# Patient Record
Sex: MALE | Race: WHITE | Employment: OTHER | URBAN - METROPOLITAN AREA
[De-identification: names, ages, dates, MRNs, and addresses within clinical notes are randomized per-mention and may not be internally consistent; named-entity substitution may affect disease eponyms.]

---

## 2017-12-05 ENCOUNTER — OFFICE VISIT (OUTPATIENT)
Dept: INTERNAL MEDICINE CLINIC | Age: 62
End: 2017-12-05

## 2017-12-05 VITALS
TEMPERATURE: 97.9 F | BODY MASS INDEX: 37.23 KG/M2 | DIASTOLIC BLOOD PRESSURE: 70 MMHG | SYSTOLIC BLOOD PRESSURE: 112 MMHG | HEART RATE: 80 BPM | HEIGHT: 67 IN | WEIGHT: 237.2 LBS | OXYGEN SATURATION: 95 % | RESPIRATION RATE: 16 BRPM

## 2017-12-05 DIAGNOSIS — Z12.5 PROSTATE CANCER SCREENING: ICD-10-CM

## 2017-12-05 DIAGNOSIS — Z00.00 PHYSICAL EXAM: Primary | ICD-10-CM

## 2017-12-05 DIAGNOSIS — R10.31 RIGHT GROIN PAIN: ICD-10-CM

## 2017-12-05 DIAGNOSIS — R45.84 ANHEDONIA: ICD-10-CM

## 2017-12-05 DIAGNOSIS — Z23 NEED FOR SHINGLES VACCINE: ICD-10-CM

## 2017-12-05 DIAGNOSIS — B35.1 ONYCHOMYCOSIS: ICD-10-CM

## 2017-12-05 DIAGNOSIS — Z11.59 NEED FOR HEPATITIS C SCREENING TEST: ICD-10-CM

## 2017-12-05 DIAGNOSIS — Z23 NEED FOR PNEUMOCOCCAL VACCINE: ICD-10-CM

## 2017-12-05 DIAGNOSIS — L57.0 ACTINIC KERATOSES: ICD-10-CM

## 2017-12-05 RX ORDER — TERBINAFINE HYDROCHLORIDE 250 MG/1
250 TABLET ORAL DAILY
Qty: 30 TAB | Refills: 0 | Status: SHIPPED | OUTPATIENT
Start: 2017-12-05 | End: 2018-01-05 | Stop reason: SDUPTHER

## 2017-12-05 NOTE — PROGRESS NOTES
HISTORY OF PRESENT ILLNESS  Martita Teixeira is a 57 y.o. male. HPI Shilpa Newell is seen today for a complete physical examination as well as for follow up of other concerns. He is accompanied by his wife. Preventive medicine. Fully reviewed today. He is due for a complete physical examination and routine screening laboratory studies. Also, due for an EKG update. He is up to date with a Tdap booster. Will hold off on Zostavax given the new shingles vaccine coming out next year. Other concerns. 1. There is question of depression. He has lost some interest in certain activities that previously he found enjoyable. He has not withdrawn, however, nor does he feel sad or have thoughts of harming himself. He really feels he would not want medication treatment. In the end we decided to follow the symptoms and will let me know should he worsen. We will check a testosterone level to check normal level. 2. Toe nail fungus and skin lesion on his nose. He also has an irritable scalp. For these various conditions, we will refer for dermatology evaluation. Consider Lamisil once his labs are complete. 3. Right groin pain. This tends to wax and wane and is sharp. We will obtain an x-ray. 4. Left hand pain for several months consistent with arthritis. We counseled for 15 minutes regarding his various medical questions and concerns, as above, reviewed a plan for each condition. This component of the visit was 15 minutes, greater than 50% of this component of the visit was spent in counseling. MedDATA/gwo     We counseled regarding healthy lifestyle issues including diet, exercise and stress management. Family history, social history, etc. Are reviewed and updated, see electronic record. I have reviewed/discussed the above normal BMI with the patient and spouse. I have recommended the following interventions: dietary management education, guidance, and counseling and encourage exercise . Faraz Hart        Review of Systems Constitutional: Negative for weight loss. Respiratory: Negative. Cardiovascular: Negative for chest pain, palpitations, leg swelling and PND. Gastrointestinal: Negative. Genitourinary: Positive for frequency. Negative for hematuria. Musculoskeletal: Positive for joint pain. Negative for myalgias. Skin: Positive for itching and rash. Neurological: Negative for focal weakness. Psychiatric/Behavioral: Negative for depression. The patient is not nervous/anxious. Physical Exam   Constitutional: He is oriented to person, place, and time. He appears well-developed and well-nourished. No distress. HENT:   Head: Normocephalic and atraumatic. Right Ear: Tympanic membrane, external ear and ear canal normal.   Left Ear: Tympanic membrane, external ear and ear canal normal.   Eyes: EOM are normal. Pupils are equal, round, and reactive to light. Right eye exhibits no discharge. Left eye exhibits no discharge. Neck: Normal range of motion. Neck supple. Carotid bruit is not present. No thyromegaly present. Cardiovascular: Normal rate, regular rhythm, normal heart sounds and intact distal pulses. Exam reveals no gallop and no friction rub. No murmur heard. Pulmonary/Chest: Effort normal and breath sounds normal. No respiratory distress. He has no wheezes. He has no rales. Abdominal: Soft. Bowel sounds are normal. He exhibits no distension and no mass. There is no tenderness. There is no rebound and no guarding. Genitourinary: Rectum normal. Rectal exam shows no mass and no tenderness. Prostate is enlarged. Prostate is not tender. Musculoskeletal: Normal range of motion. He exhibits no edema or tenderness. Lymphadenopathy:     He has no cervical adenopathy. Neurological: He is alert and oriented to person, place, and time. He has normal reflexes. Skin: Skin is warm and dry. No rash noted. Psychiatric: He has a normal mood and affect.  His behavior is normal.   Nursing note and vitals reviewed. ASSESSMENT and PLAN  Diagnoses and all orders for this visit:    1. Physical exam  -     AMB POC EKG ROUTINE W/ 12 LEADS, INTER & REP  -     TSH 3RD GENERATION  -     METABOLIC PANEL, COMPREHENSIVE  -     CBC WITH AUTOMATED DIFF  -     LIPID PANEL  -     URINALYSIS W/ RFLX MICROSCOPIC    2. Need for hepatitis C screening test  -     HEPATITIS C AB    3. Prostate cancer screening  -     PSA SCREENING (SCREENING)    4. Need for shingles vaccine- hold    5. Need for pneumococcal vaccine- when turns 63    6. Anhedonia  -     TESTOSTERONE, FREE & TOTAL    7. Actinic keratoses  -     REFERRAL TO DERMATOLOGY    8. Right groin pain  -     XR HIP RT W OR WO PELV 2-3 VWS; Future    9. Onychomycosis  -     terbinafine HCl (LAMISIL) 250 mg tablet; Take 1 Tab by mouth daily.

## 2017-12-05 NOTE — MR AVS SNAPSHOT
Visit Information Date & Time Provider Department Dept. Phone Encounter #  
 12/5/2017  3:45 PM Zackery Koch, [address] [facility] [phone] [id number] Follow-up Instructions Return in about 1 year (around 12/5/2018). Upcoming Health Maintenance Date Due Hepatitis C Screening 1955 ZOSTER VACCINE AGE 60> 7/17/2013 DTaP/Tdap/Td series (2 - Td) 5/20/2024 Allergies as of 12/5/2017  Review Complete On: 12/5/2017 By: Zackery Koch MD  
  
 Severity Noted Reaction Type Reactions Latex, Natural Rubber Low 05/20/2014   Topical Itching Current Immunizations  Reviewed on 5/20/2014 Name Date Tdap 5/20/2014 Not reviewed this visit You Were Diagnosed With   
  
 Codes Comments Physical exam    -  Primary ICD-10-CM: Z00.00 ICD-9-CM: V70.9 Need for hepatitis C screening test     ICD-10-CM: Z11.59 
ICD-9-CM: V73.89 Prostate cancer screening     ICD-10-CM: Z12.5 ICD-9-CM: V76.44 Need for shingles vaccine     ICD-10-CM: L72 ICD-9-CM: V04.89 Need for pneumococcal vaccine     ICD-10-CM: N09 ICD-9-CM: V03.82 Anhedonia     ICD-10-CM: R45.84 ICD-9-CM: 780.99 Actinic keratoses     ICD-10-CM: L57.0 ICD-9-CM: 702.0 Right groin pain     ICD-10-CM: R10.31 ICD-9-CM: 789.09 Onychomycosis     ICD-10-CM: B35.1 ICD-9-CM: 110.1 Vitals BP Pulse Temp Resp Height(growth percentile) Weight(growth percentile) 112/70 (BP 1 Location: Right arm, BP Patient Position: Sitting) 80 97.9 °F (36.6 °C) (Oral) 16 5' 7\" (1.702 m) 237 lb 3.2 oz (107.6 kg) SpO2 BMI Smoking Status 95% 37.15 kg/m2 Never Smoker BMI and BSA Data Body Mass Index Body Surface Area  
 37.15 kg/m 2 2.26 m 2 Preferred Pharmacy Pharmacy Name Phone [address] [place] [address], [address] [place] [address] [phone] Your Updated Medication List  
  
 This list is accurate as of: 12/5/17  4:57 PM.  Always use your most recent med list.  
  
  
  
  
 ASPIR-81 81 mg tablet Generic drug:  aspirin delayed-release Take 81 mg by mouth daily. multivitamin, stress formula tablet Commonly known as:  STRESS TAB Take 1 Tab by mouth daily. terbinafine HCl 250 mg tablet Commonly known as:  LAMISIL Take 1 Tab by mouth daily. Prescriptions Sent to Pharmacy Refills  
 terbinafine HCl (LAMISIL) 250 mg tablet 0 Sig: Take 1 Tab by mouth daily. Class: Normal  
 Pharmacy: [facility] [address] [place] [address] AT [facility] [address] Ph #: [phone] Route: Oral  
  
We Performed the Following AMB POC EKG ROUTINE W/ 12 LEADS, INTER & REP [49551 CPT(R)] CBC WITH AUTOMATED DIFF [50751 CPT(R)] HEPATITIS C AB [36003 CPT(R)] LIPID PANEL [39850 CPT(R)] METABOLIC PANEL, COMPREHENSIVE [20312 CPT(R)] PSA SCREENING (SCREENING) [ HCP] REFERRAL TO DERMATOLOGY [REF19 Custom] TESTOSTERONE, FREE & TOTAL [00252 CPT(R)] TSH 3RD GENERATION [47659 CPT(R)] URINALYSIS W/ RFLX MICROSCOPIC [16468 CPT(R)] Follow-up Instructions Return in about 1 year (around 12/5/2018). To-Do List   
 12/06/2017 Imaging:  XR HIP RT W OR WO PELV 2-3 VWS Referral Information Referral ID Referred By Referred To  
  
 [id number] Celine WITT MD   
   [address] [place] [address] [address] [place] [address] Avenue Phone: [phone] Fax: [phone] Visits Status Start Date End Date 1 New Request 12/5/17 12/5/18 If your referral has a status of pending review or denied, additional information will be sent to support the outcome of this decision. Introducing [facility]!    
 Tiera Jordan introduces [employer] patient portal. Now you can access parts of your medical record, email your doctor's office, and request medication refills online. 1. In your internet browser, go to [email or web address] 2. Click on the First Time User? Click Here link in the Sign In box. You will see the New Member Sign Up page. 3. Enter your [employer] Access Code exactly as it appears below. You will not need to use this code after youve completed the sign-up process. If you do not sign up before the expiration date, you must request a new code. · [employer] Access Code: [email or web address] Expires: 3/5/2018  3:33 PM 
 
4. Enter the last four digits of your Social Security Number (xxxx) and Date of Birth (mm/dd/yyyy) as indicated and click Submit. You will be taken to the next sign-up page. 5. Create a [employer] ID. This will be your [employer] login ID and cannot be changed, so think of one that is secure and easy to remember. 6. Create a [employer] password. You can change your password at any time. 7. Enter your Password Reset Question and Answer. This can be used at a later time if you forget your password. 8. Enter your e-mail address. You will receive e-mail notification when new information is available in [address] 9. Click Sign Up. You can now view and download portions of your medical record. 10. Click the Download Summary menu link to download a portable copy of your medical information. If you have questions, please visit the Frequently Asked Questions section of the [employer] website. Remember, [employer] is NOT to be used for urgent needs. For medical emergencies, dial 911. Now available from your iPhone and Android! Please provide this summary of care documentation to your next provider. Your primary care clinician is listed as ELIGIO WITT. If you have any questions after today's visit, please call [phone].

## 2017-12-10 LAB
ALBUMIN SERPL-MCNC: 4.4 G/DL (ref 3.6–4.8)
ALBUMIN/GLOB SERPL: 1.8 {RATIO} (ref 1.2–2.2)
ALP SERPL-CCNC: 50 IU/L (ref 39–117)
ALT SERPL-CCNC: 18 IU/L (ref 0–44)
APPEARANCE UR: CLEAR
AST SERPL-CCNC: 21 IU/L (ref 0–40)
BASOPHILS # BLD AUTO: 0 X10E3/UL (ref 0–0.2)
BASOPHILS NFR BLD AUTO: 0 %
BILIRUB SERPL-MCNC: 0.7 MG/DL (ref 0–1.2)
BILIRUB UR QL STRIP: NEGATIVE
BUN SERPL-MCNC: 12 MG/DL (ref 8–27)
BUN/CREAT SERPL: 13 (ref 10–24)
CALCIUM SERPL-MCNC: 9.3 MG/DL (ref 8.6–10.2)
CHLORIDE SERPL-SCNC: 103 MMOL/L (ref 96–106)
CHOLEST SERPL-MCNC: 210 MG/DL (ref 100–199)
CO2 SERPL-SCNC: 24 MMOL/L (ref 18–29)
COLOR UR: YELLOW
CREAT SERPL-MCNC: 0.96 MG/DL (ref 0.76–1.27)
EOSINOPHIL # BLD AUTO: 0.3 X10E3/UL (ref 0–0.4)
EOSINOPHIL NFR BLD AUTO: 5 %
ERYTHROCYTE [DISTWIDTH] IN BLOOD BY AUTOMATED COUNT: 13.1 % (ref 12.3–15.4)
GFR SERPLBLD CREATININE-BSD FMLA CKD-EPI: 83 ML/MIN/1.73
GFR SERPLBLD CREATININE-BSD FMLA CKD-EPI: 96 ML/MIN/1.73
GLOBULIN SER CALC-MCNC: 2.5 G/DL (ref 1.5–4.5)
GLUCOSE SERPL-MCNC: 112 MG/DL (ref 65–99)
GLUCOSE UR QL: NEGATIVE
HCT VFR BLD AUTO: 44.8 % (ref 37.5–51)
HCV AB S/CO SERPL IA: <0.1 S/CO RATIO (ref 0–0.9)
HDLC SERPL-MCNC: 49 MG/DL
HGB BLD-MCNC: 15.5 G/DL (ref 13–17.7)
HGB UR QL STRIP: NEGATIVE
IMM GRANULOCYTES # BLD: 0 X10E3/UL (ref 0–0.1)
IMM GRANULOCYTES NFR BLD: 0 %
KETONES UR QL STRIP: NEGATIVE
LDLC SERPL CALC-MCNC: 139 MG/DL (ref 0–99)
LEUKOCYTE ESTERASE UR QL STRIP: NEGATIVE
LYMPHOCYTES # BLD AUTO: 2 X10E3/UL (ref 0.7–3.1)
LYMPHOCYTES NFR BLD AUTO: 32 %
MCH RBC QN AUTO: 30.5 PG (ref 26.6–33)
MCHC RBC AUTO-ENTMCNC: 34.6 G/DL (ref 31.5–35.7)
MCV RBC AUTO: 88 FL (ref 79–97)
MICRO URNS: NORMAL
MONOCYTES # BLD AUTO: 0.6 X10E3/UL (ref 0.1–0.9)
MONOCYTES NFR BLD AUTO: 10 %
NEUTROPHILS # BLD AUTO: 3.4 X10E3/UL (ref 1.4–7)
NEUTROPHILS NFR BLD AUTO: 53 %
NITRITE UR QL STRIP: NEGATIVE
PH UR STRIP: 6 [PH] (ref 5–7.5)
PLATELET # BLD AUTO: 208 X10E3/UL (ref 150–379)
POTASSIUM SERPL-SCNC: 4.4 MMOL/L (ref 3.5–5.2)
PROT SERPL-MCNC: 6.9 G/DL (ref 6–8.5)
PROT UR QL STRIP: NEGATIVE
PSA SERPL-MCNC: 0.7 NG/ML (ref 0–4)
RBC # BLD AUTO: 5.09 X10E6/UL (ref 4.14–5.8)
SODIUM SERPL-SCNC: 141 MMOL/L (ref 134–144)
SP GR UR: 1.02 (ref 1–1.03)
TESTOST FREE SERPL-MCNC: 9.7 PG/ML (ref 6.6–18.1)
TESTOST SERPL-MCNC: 569 NG/DL (ref 264–916)
TRIGL SERPL-MCNC: 109 MG/DL (ref 0–149)
TSH SERPL DL<=0.005 MIU/L-ACNC: 1.6 UIU/ML (ref 0.45–4.5)
UROBILINOGEN UR STRIP-MCNC: 0.2 MG/DL (ref 0.2–1)
VLDLC SERPL CALC-MCNC: 22 MG/DL (ref 5–40)
WBC # BLD AUTO: 6.4 X10E3/UL (ref 3.4–10.8)

## 2017-12-11 ENCOUNTER — HOSPITAL ENCOUNTER (OUTPATIENT)
Dept: GENERAL RADIOLOGY | Age: 62
Discharge: HOME | End: 2017-12-11
Payer: COMMERCIAL

## 2017-12-11 DIAGNOSIS — R10.31 RIGHT GROIN PAIN: ICD-10-CM

## 2017-12-11 PROCEDURE — 73502 X-RAY EXAM HIP UNI 2-3 VIEWS: CPT

## 2017-12-13 LAB
HBA1C MFR BLD: 5.5 % (ref 4.8–5.6)
SPECIMEN STATUS REPORT, ROLRST: NORMAL

## 2017-12-21 ENCOUNTER — TELEPHONE (OUTPATIENT)
Dept: INTERNAL MEDICINE CLINIC | Age: 62
End: 2017-12-21

## 2017-12-21 DIAGNOSIS — E78.2 MIXED HYPERLIPIDEMIA: Primary | ICD-10-CM

## 2017-12-21 RX ORDER — ATORVASTATIN CALCIUM 20 MG/1
20 TABLET, FILM COATED ORAL DAILY
Qty: 30 TAB | Refills: 11 | Status: SHIPPED | OUTPATIENT
Start: 2017-12-21 | End: 2018-12-06 | Stop reason: SDUPTHER

## 2017-12-21 NOTE — TELEPHONE ENCOUNTER
Reviewed lab - Labs look great overall except cholesterol. 10 yr pooled risk percent for atherosclerosis > 7.5. Advised lipitor, Reviewed side effects, goal of treatment and need for follow up  . He agrees. Check lipid panel and appropriate studies to rule out med side effects in 3 months. High risk med management.  With ov

## 2018-01-05 DIAGNOSIS — B35.1 ONYCHOMYCOSIS: ICD-10-CM

## 2018-01-05 RX ORDER — TERBINAFINE HYDROCHLORIDE 250 MG/1
250 TABLET ORAL DAILY
Qty: 30 TAB | Refills: 3 | Status: SHIPPED | OUTPATIENT
Start: 2018-01-05 | End: 2019-02-28 | Stop reason: ALTCHOICE

## 2018-03-25 LAB
ALBUMIN SERPL-MCNC: 4.4 G/DL (ref 3.6–4.8)
ALP SERPL-CCNC: 54 IU/L (ref 39–117)
ALT SERPL-CCNC: 14 IU/L (ref 0–44)
AST SERPL-CCNC: 18 IU/L (ref 0–40)
BILIRUB DIRECT SERPL-MCNC: 0.18 MG/DL (ref 0–0.4)
BILIRUB SERPL-MCNC: 0.7 MG/DL (ref 0–1.2)
CHOLEST SERPL-MCNC: 150 MG/DL (ref 100–199)
HDLC SERPL-MCNC: 51 MG/DL
LDLC SERPL CALC-MCNC: 82 MG/DL (ref 0–99)
PROT SERPL-MCNC: 6.8 G/DL (ref 6–8.5)
TRIGL SERPL-MCNC: 85 MG/DL (ref 0–149)
VLDLC SERPL CALC-MCNC: 17 MG/DL (ref 5–40)

## 2018-03-29 ENCOUNTER — OFFICE VISIT (OUTPATIENT)
Dept: INTERNAL MEDICINE CLINIC | Age: 63
End: 2018-03-29

## 2018-03-29 VITALS
BODY MASS INDEX: 36.85 KG/M2 | SYSTOLIC BLOOD PRESSURE: 115 MMHG | RESPIRATION RATE: 18 BRPM | HEART RATE: 86 BPM | WEIGHT: 234.8 LBS | OXYGEN SATURATION: 95 % | DIASTOLIC BLOOD PRESSURE: 60 MMHG | HEIGHT: 67 IN | TEMPERATURE: 99 F

## 2018-03-29 DIAGNOSIS — M54.41 ACUTE RIGHT-SIDED LOW BACK PAIN WITH RIGHT-SIDED SCIATICA: ICD-10-CM

## 2018-03-29 DIAGNOSIS — Z23 NEED FOR SHINGLES VACCINE: ICD-10-CM

## 2018-03-29 DIAGNOSIS — E78.2 MIXED HYPERLIPIDEMIA: Primary | ICD-10-CM

## 2018-03-29 DIAGNOSIS — Z00.00 ROUTINE GENERAL MEDICAL EXAMINATION AT A HEALTH CARE FACILITY: ICD-10-CM

## 2018-03-29 DIAGNOSIS — E66.01 SEVERE OBESITY (BMI 35.0-39.9) WITH COMORBIDITY (HCC): ICD-10-CM

## 2018-03-29 NOTE — PROGRESS NOTES
HISTORY OF PRESENT ILLNESS  Tim Amos is a 57 y.o. male. [facility] Tigre Mukherjee is seen today for follow up of hyperlipidemia and other concerns. 1.  Hyperlipidemia. Reviewed labs. He is new to statin treatment. He has significant elevated pooled risk percentage so we started treatment. He has had no side effect or cardiac symptom. He has had a great improvement in his cholesterol readings. 2.  Obesity, BMI 36+, encouraged diet and exercise. 3.  Nail fungus, on treatment with a good response. Review of  Systems:  Notable for right sided sciatica for 4 weeks, finally improving. Current Outpatient Prescriptions   Medication Sig    atorvastatin (LIPITOR) 20 mg tablet Take 1 Tab by mouth daily.  aspirin delayed-release (ASPIR-81) 81 mg tablet Take 81 mg by mouth daily.  terbinafine HCl (LAMISIL) 250 mg tablet Take 1 Tab by mouth daily. No current facility-administered medications for this visit. Review of Systems   Constitutional: Negative for weight loss. Respiratory: Negative. Cardiovascular: Negative for chest pain, palpitations, leg swelling and PND. Musculoskeletal: Positive for back pain. Negative for myalgias. Neurological: Negative for focal weakness. Physical Exam   Constitutional: No distress. Neck: Carotid bruit is not present. Cardiovascular: Normal rate and regular rhythm. Exam reveals no gallop and no friction rub. No murmur heard. Pulmonary/Chest: Effort normal and breath sounds normal. No respiratory distress. Musculoskeletal: He exhibits no edema. Nursing note and vitals reviewed. ASSESSMENT and PLAN  Diagnoses and all orders for this visit:    1. Mixed hyperlipidemia- Check lipid panel and appropriate studies to rule out med side effects yearly. 2. Need for shingles vaccine  -     varicella-zoster recombinant, PF, (SHINGRIX) 50 mcg/0.5 mL susr injection; 0.5 mL by IntraMUSCular route once for 1 dose. Repeat in 2 to 6mnths.     3. Severe obesity (BMI 35.0-39. 9) with comorbidity ([address]- Diet and exercise     4. Acute right-sided low back pain with right-sided sciatica- Call with recurrence , See patient instructions     5.  Routine general medical examination at a health care facility  -     TSH 3RD GENERATION  -     METABOLIC PANEL, COMPREHENSIVE  -     CBC WITH AUTOMATED DIFF  -     HEMOGLOBIN A1C WITH EAG  -     PSA SCREENING (SCREENING)  -     LIPID PANEL

## 2018-03-29 NOTE — PATIENT INSTRUCTIONS
Sciatica: Care Instructions  Your Care Instructions    Sciatica (say \"cyo-AB-ro-kuh\") is an irritation of one of the sciatic nerves, which come from the spinal cord in the lower back. The sciatic nerves and their branches extend down through the buttock to the foot. Sciatica can develop when an injured disc in the back presses against a spinal nerve root. Its main symptom is pain, numbness, or weakness that is often worse in the leg or foot than in the back. Sciatica often will improve and go away with time. Early treatment usually includes medicines and exercises to relieve pain. Follow-up care is a key part of your treatment and safety. Be sure to make and go to all appointments, and call your doctor if you are having problems. It's also a good idea to know your test results and keep a list of the medicines you take. How can you care for yourself at home? · Take pain medicines exactly as directed. ¨ If the doctor gave you a prescription medicine for pain, take it as prescribed. ¨ If you are not taking a prescription pain medicine, ask your doctor if you can take an over-the-counter medicine. · Use heat or ice to relieve pain. ¨ To apply heat, put a warm water bottle, heating pad set on low, or warm cloth on your back. Do not go to sleep with a heating pad on your skin. ¨ To use ice, put ice or a cold pack on the area for 10 to 20 minutes at a time. Put a thin cloth between the ice and your skin. · Avoid sitting if possible, unless it feels better than standing. · Alternate lying down with short walks. Increase your walking distance as you are able to without making your symptoms worse. · Do not do anything that makes your symptoms worse. When should you call for help? Call 911 anytime you think you may need emergency care. For example, call if:  · You are unable to move a leg at all.   Call your doctor now or seek immediate medical care if:  · You have new or worse symptoms in your legs or buttocks. Symptoms may include:  ¨ Numbness or tingling. ¨ Weakness. ¨ Pain. · You lose bladder or bowel control. Watch closely for changes in your health, and be sure to contact your doctor if:  · You are not getting better as expected. Where can you learn more? Go to http://chuck-maria.info/. Enter [phone] in the search box to learn more about \"Sciatica: Care Instructions. \"  Current as of: March 21, 2017  Content Version: 11.4  © 7546-4548 [employer]. Care instructions adapted under license by [employer] (which disclaims liability or warranty for this information). If you have questions about a medical condition or this instruction, always ask your healthcare professional. [address] any warranty or liability for your use of this information. Sciatica: Exercises  Your Care Instructions  Here are some examples of typical rehabilitation exercises for your condition. Start each exercise slowly. Ease off the exercise if you start to have pain. Your doctor or physical therapist will tell you when you can start these exercises and which ones will work best for you. When you are not being active, find a comfortable position for rest. Some people are comfortable on the floor or a medium-firm bed with a small pillow under their head and another under their knees. Some people prefer to lie on their side with a pillow between their knees. Don't stay in one position for too long. Take short walks (10 to 20 minutes) every 2 to 3 hours. Avoid slopes, hills, and stairs until you feel better. Walk only distances you can manage without pain, especially leg pain. How to do the exercises  Back stretches    1. Get down on your hands and knees on the floor. 2. Relax your head and allow it to droop. Round your back up toward the ceiling until you feel a nice stretch in your upper, middle, and lower back.  Hold this stretch for as long as it feels comfortable, or about 15 to 30 seconds. 3. Return to the starting position with a flat back while you are on your hands and knees. 4. Let your back sway by pressing your stomach toward the floor. Lift your buttocks toward the ceiling. 5. Hold this position for 15 to 30 seconds. 6. Repeat 2 to 4 times. Follow-up care is a key part of your treatment and safety. Be sure to make and go to all appointments, and call your doctor if you are having problems. It's also a good idea to know your test results and keep a list of the medicines you take. Where can you learn more? Go to http://chuck-maria.info/. Enter F004 in the search box to learn more about \"Sciatica: Exercises. \"  Current as of: March 21, 2017  Content Version: 11.4  © 8451-0784 [employer]. Care instructions adapted under license by [employer] (which disclaims liability or warranty for this information). If you have questions about a medical condition or this instruction, always ask your healthcare professional. [place] [address] any warranty or liability for your use of this information.

## 2018-03-29 NOTE — MR AVS SNAPSHOT
[address] [facility] [address] [place] [address] 57 
[phone] Patient: Virginia Garduno MRN: [id number] DRR:[id number] Visit Information Date & Time Provider Department Dept. Phone Encounter #  
 3/29/2018  5:05 PM Dedrick Trent, [facility] Internal Medicine [phone] [id number] Follow-up Instructions Return in about 9 months (around 12/29/2018). Upcoming Health Maintenance Date Due ZOSTER VACCINE AGE 60> 7/17/2013 DTaP/Tdap/Td series (2 - Td) 5/20/2024 Allergies as of 3/29/2018  Review Complete On: 3/29/2018 By: Dedrick Trent MD  
  
 Severity Noted Reaction Type Reactions Latex, Natural Rubber Low 05/20/2014   Topical Itching Current Immunizations  Reviewed on 5/20/2014 Name Date Tdap 5/20/2014 Not reviewed this visit You Were Diagnosed With   
  
 Codes Comments Mixed hyperlipidemia    -  Primary ICD-10-CM: Z99.1 ICD-9-CM: 272.2 Need for shingles vaccine     ICD-10-CM: H44 ICD-9-CM: V04.89 Severe obesity (BMI 35.0-39. 9) with comorbidity ([facility] [address]     ICD-10-CM: E66.01 
ICD-9-CM: 278.01 Acute right-sided low back pain with right-sided sciatica     ICD-10-CM: M54.41 
ICD-9-CM: 724.2, 724.3 Routine general medical examination at a health care facility     ICD-10-CM: Z00.00 ICD-9-CM: V70.0 Vitals BP Pulse Temp Resp Height(growth percentile) Weight(growth percentile)  
 115/60 (BP 1 Location: Right arm, BP Patient Position: Sitting) 86 99 °F (37.2 °C) (Oral) 18 5' 7\" (1.702 m) 234 lb 12.8 oz (106.5 kg) SpO2 BMI Smoking Status 95% 36.77 kg/m2 Never Smoker BMI and BSA Data Body Mass Index Body Surface Area  
 36.77 kg/m 2 2.24 m 2 Preferred Pharmacy Pharmacy Name Phone 99 [facility], [address] [place] [address] [phone] Your Updated Medication List  
  
   
 This list is accurate as of 3/29/18  5:55 PM.  Always use your most recent med list.  
  
  
  
  
 ASPIR-81 81 mg tablet Generic drug:  aspirin delayed-release Take 81 mg by mouth daily. atorvastatin 20 mg tablet Commonly known as:  LIPITOR Take 1 Tab by mouth daily. terbinafine HCl 250 mg tablet Commonly known as:  LAMISIL Take 1 Tab by mouth daily. varicella-zoster recombinant (PF) 50 mcg/0.5 mL Susr injection Commonly known as:  SHINGRIX  
0.5 mL by IntraMUSCular route once for 1 dose. Repeat in 2 to 6mnths. Prescriptions Printed Refills  
 varicella-zoster recombinant, PF, (SHINGRIX) 50 mcg/0.5 mL susr injection 0 Si.5 mL by IntraMUSCular route once for 1 dose. Repeat in 2 to 6mnths. Class: Print Route: IntraMUSCular We Performed the Following CBC WITH AUTOMATED DIFF [75634 CPT(R)] HEMOGLOBIN A1C WITH EAG [03213 CPT(R)] LIPID PANEL [46996 CPT(R)] METABOLIC PANEL, COMPREHENSIVE [69801 CPT(R)] PSA SCREENING (SCREENING) [ [facility]] TSH 3RD GENERATION [70596 CPT(R)] Follow-up Instructions Return in about 9 months (around 2018). Patient Instructions Sciatica: Care Instructions Your Care Instructions Sciatica (say \"voy-LM-kx-kuh\") is an irritation of one of the sciatic nerves, which come from the spinal cord in the lower back. The sciatic nerves and their branches extend down through the buttock to the foot. Sciatica can develop when an injured disc in the back presses against a spinal nerve root. Its main symptom is pain, numbness, or weakness that is often worse in the leg or foot than in the back. Sciatica often will improve and go away with time. Early treatment usually includes medicines and exercises to relieve pain. Follow-up care is a key part of your treatment and safety.  Be sure to make and go to all appointments, and call your doctor if you are having problems. It's also a good idea to know your test results and keep a list of the medicines you take. How can you care for yourself at home? · Take pain medicines exactly as directed. ¨ If the doctor gave you a prescription medicine for pain, take it as prescribed. ¨ If you are not taking a prescription pain medicine, ask your doctor if you can take an over-the-counter medicine. · Use heat or ice to relieve pain. ¨ To apply heat, put a warm water bottle, heating pad set on low, or warm cloth on your back. Do not go to sleep with a heating pad on your skin. ¨ To use ice, put ice or a cold pack on the area for 10 to 20 minutes at a time. Put a thin cloth between the ice and your skin. · Avoid sitting if possible, unless it feels better than standing. · Alternate lying down with short walks. Increase your walking distance as you are able to without making your symptoms worse. · Do not do anything that makes your symptoms worse. When should you call for help? Call 911 anytime you think you may need emergency care. For example, call if: 
· You are unable to move a leg at all. Call your doctor now or seek immediate medical care if: 
· You have new or worse symptoms in your legs or buttocks. Symptoms may include: ¨ Numbness or tingling. ¨ Weakness. ¨ Pain. · You lose bladder or bowel control. Watch closely for changes in your health, and be sure to contact your doctor if: 
· You are not getting better as expected. Where can you learn more? Go to http://chuck-maria.info/. Enter [phone] in the search box to learn more about \"Sciatica: Care Instructions. \" Current as of: March 21, 2017 Content Version: 11.4 © 5602-4746 [employer]. Care instructions adapted under license by [employer] (which disclaims liability or warranty for this information).  If you have questions about a medical condition or this instruction, always ask your healthcare professional. [address] any warranty or liability for your use of this information. Sciatica: Exercises Your Care Instructions Here are some examples of typical rehabilitation exercises for your condition. Start each exercise slowly. Ease off the exercise if you start to have pain. Your doctor or physical therapist will tell you when you can start these exercises and which ones will work best for you. When you are not being active, find a comfortable position for rest. Some people are comfortable on the floor or a medium-firm bed with a small pillow under their head and another under their knees. Some people prefer to lie on their side with a pillow between their knees. Don't stay in one position for too long. Take short walks (10 to 20 minutes) every 2 to 3 hours. Avoid slopes, hills, and stairs until you feel better. Walk only distances you can manage without pain, especially leg pain. How to do the exercises Back stretches 1. Get down on your hands and knees on the floor. 2. Relax your head and allow it to droop. Round your back up toward the ceiling until you feel a nice stretch in your upper, middle, and lower back. Hold this stretch for as long as it feels comfortable, or about 15 to 30 seconds. 3. Return to the starting position with a flat back while you are on your hands and knees. 4. Let your back sway by pressing your stomach toward the floor. Lift your buttocks toward the ceiling. 5. Hold this position for 15 to 30 seconds. 6. Repeat 2 to 4 times. Follow-up care is a key part of your treatment and safety. Be sure to make and go to all appointments, and call your doctor if you are having problems. It's also a good idea to know your test results and keep a list of the medicines you take. Where can you learn more? Go to http://chuck-maria.info/. Enter R198 in the search box to learn more about \"Sciatica: Exercises. \" Current as of: March 21, 2017 Content Version: 11.4 © 3065-2140 [employer]. Care instructions adapted under license by [employer] (which disclaims liability or warranty for this information). If you have questions about a medical condition or this instruction, always ask your healthcare professional. [address] any warranty or liability for your use of this information. Introducing [facility]! Dear Danuta Fox: 
Thank you for requesting a Clerts! account. Our records indicate that you already have an active Clerts! account. You can access your account anytime at [email or web address] Did you know that you can access your hospital and ER discharge instructions at any time in Clerts!? You can also review all of your test results from your hospital stay or ER visit. Additional Information If you have questions, please visit the Frequently Asked Questions section of the Clerts! website at [email or web address] Remember, Clerts! is NOT to be used for urgent needs. For medical emergencies, dial 911. Now available from your iPhone and Android! Please provide this summary of care documentation to your next provider. Your primary care clinician is listed as ELIGIO WITT. If you have any questions after today's visit, please call [phone].

## 2018-12-06 RX ORDER — ATORVASTATIN CALCIUM 20 MG/1
20 TABLET, FILM COATED ORAL DAILY
Qty: 30 TAB | Refills: 0 | Status: SHIPPED | OUTPATIENT
Start: 2018-12-06 | End: 2019-01-12 | Stop reason: SDUPTHER

## 2019-02-15 LAB
ALBUMIN SERPL-MCNC: 4.5 G/DL (ref 3.6–4.8)
ALBUMIN/GLOB SERPL: 1.7 {RATIO} (ref 1.2–2.2)
ALP SERPL-CCNC: 61 IU/L (ref 39–117)
ALT SERPL-CCNC: 20 IU/L (ref 0–44)
AST SERPL-CCNC: 23 IU/L (ref 0–40)
BASOPHILS # BLD AUTO: 0 X10E3/UL (ref 0–0.2)
BASOPHILS NFR BLD AUTO: 0 %
BILIRUB SERPL-MCNC: 0.8 MG/DL (ref 0–1.2)
BUN SERPL-MCNC: 12 MG/DL (ref 8–27)
BUN/CREAT SERPL: 12 (ref 10–24)
CALCIUM SERPL-MCNC: 9.7 MG/DL (ref 8.6–10.2)
CHLORIDE SERPL-SCNC: 104 MMOL/L (ref 96–106)
CHOLEST SERPL-MCNC: 160 MG/DL (ref 100–199)
CO2 SERPL-SCNC: 24 MMOL/L (ref 20–29)
CREAT SERPL-MCNC: 1.02 MG/DL (ref 0.76–1.27)
EOSINOPHIL # BLD AUTO: 0.2 X10E3/UL (ref 0–0.4)
EOSINOPHIL NFR BLD AUTO: 2 %
ERYTHROCYTE [DISTWIDTH] IN BLOOD BY AUTOMATED COUNT: 13.5 % (ref 12.3–15.4)
EST. AVERAGE GLUCOSE BLD GHB EST-MCNC: 117 MG/DL
GLOBULIN SER CALC-MCNC: 2.7 G/DL (ref 1.5–4.5)
GLUCOSE SERPL-MCNC: 92 MG/DL (ref 65–99)
HBA1C MFR BLD: 5.7 % (ref 4.8–5.6)
HCT VFR BLD AUTO: 46 % (ref 37.5–51)
HDLC SERPL-MCNC: 55 MG/DL
HGB BLD-MCNC: 15.3 G/DL (ref 13–17.7)
IMM GRANULOCYTES # BLD AUTO: 0 X10E3/UL (ref 0–0.1)
IMM GRANULOCYTES NFR BLD AUTO: 0 %
LDLC SERPL CALC-MCNC: 88 MG/DL (ref 0–99)
LYMPHOCYTES # BLD AUTO: 3.6 X10E3/UL (ref 0.7–3.1)
LYMPHOCYTES NFR BLD AUTO: 33 %
MCH RBC QN AUTO: 30.2 PG (ref 26.6–33)
MCHC RBC AUTO-ENTMCNC: 33.3 G/DL (ref 31.5–35.7)
MCV RBC AUTO: 91 FL (ref 79–97)
MONOCYTES # BLD AUTO: 0.9 X10E3/UL (ref 0.1–0.9)
MONOCYTES NFR BLD AUTO: 8 %
NEUTROPHILS # BLD AUTO: 6.1 X10E3/UL (ref 1.4–7)
NEUTROPHILS NFR BLD AUTO: 57 %
PLATELET # BLD AUTO: 211 X10E3/UL (ref 150–379)
POTASSIUM SERPL-SCNC: 4.7 MMOL/L (ref 3.5–5.2)
PROT SERPL-MCNC: 7.2 G/DL (ref 6–8.5)
PSA SERPL-MCNC: 0.8 NG/ML (ref 0–4)
RBC # BLD AUTO: 5.07 X10E6/UL (ref 4.14–5.8)
SODIUM SERPL-SCNC: 143 MMOL/L (ref 134–144)
TRIGL SERPL-MCNC: 86 MG/DL (ref 0–149)
TSH SERPL DL<=0.005 MIU/L-ACNC: 2.84 UIU/ML (ref 0.45–4.5)
VLDLC SERPL CALC-MCNC: 17 MG/DL (ref 5–40)
WBC # BLD AUTO: 10.9 X10E3/UL (ref 3.4–10.8)

## 2019-02-28 ENCOUNTER — OFFICE VISIT (OUTPATIENT)
Dept: INTERNAL MEDICINE CLINIC | Age: 64
End: 2019-02-28

## 2019-02-28 VITALS
RESPIRATION RATE: 18 BRPM | TEMPERATURE: 97.6 F | HEIGHT: 67 IN | HEART RATE: 91 BPM | DIASTOLIC BLOOD PRESSURE: 78 MMHG | WEIGHT: 238 LBS | SYSTOLIC BLOOD PRESSURE: 110 MMHG | OXYGEN SATURATION: 98 % | BODY MASS INDEX: 37.35 KG/M2

## 2019-02-28 DIAGNOSIS — E78.2 MIXED HYPERLIPIDEMIA: ICD-10-CM

## 2019-02-28 DIAGNOSIS — Z00.00 ROUTINE GENERAL MEDICAL EXAMINATION AT A HEALTH CARE FACILITY: Primary | ICD-10-CM

## 2019-02-28 DIAGNOSIS — Z23 ENCOUNTER FOR IMMUNIZATION: ICD-10-CM

## 2019-02-28 DIAGNOSIS — Z12.11 SCREEN FOR COLON CANCER: ICD-10-CM

## 2019-02-28 NOTE — PROGRESS NOTES
HISTORY OF PRESENT ILLNESS Samuel Harvey is a 70 y.o. male. HPI Subjective:  Karen Bray is seen today for a complete exam and follow up of chronic problems. 1. Preventive medicine, fully reviewed with him today. He is due for the Pneumovax and the other vaccines were reviewed upon a call to his pharmacy. He is up to date with labs, which I fully reviewed. He has not yet had a colonoscopy. I talked to him about the Cologuard. He agrees to this testing. 2. Chronic problems are reviewed. Elevated cholesterol is well treated with medication and no side effects. Family History:  Notable for a brother with prostate cancer. Review of Systems:  Notable for some aches and pains. His daily multiple vitamin really helps with this. He does not require any other treatments. We counseled regarding healthy lifestyle issues including diet, exercise and stress management. Family history, social history, etc. Are reviewed and updated, see electronic record. Review of Systems Constitutional: Negative for weight loss. Respiratory: Negative. Cardiovascular: Negative for chest pain, palpitations, leg swelling and PND. Gastrointestinal: Negative. Genitourinary: Positive for frequency. Musculoskeletal: Positive for joint pain. Negative for myalgias. Neurological: Negative for focal weakness. Physical Exam  
Constitutional: He is oriented to person, place, and time. He appears well-developed and well-nourished. No distress. HENT:  
Head: Normocephalic and atraumatic. Right Ear: Tympanic membrane, external ear and ear canal normal.  
Left Ear: Tympanic membrane, external ear and ear canal normal.  
Eyes: EOM are normal. Pupils are equal, round, and reactive to light. Right eye exhibits no discharge. Left eye exhibits no discharge. Neck: Normal range of motion. Neck supple. Carotid bruit is not present. No thyromegaly present. Cardiovascular: Normal rate, regular rhythm, normal heart sounds and intact distal pulses. Exam reveals no gallop and no friction rub. No murmur heard. Pulmonary/Chest: Effort normal and breath sounds normal. No respiratory distress. He has no wheezes. He has no rales. Abdominal: Soft. Bowel sounds are normal. He exhibits no distension and no mass. There is no tenderness. There is no rebound and no guarding. Genitourinary: Rectum normal and prostate normal.  
Musculoskeletal: Normal range of motion. He exhibits no edema or tenderness. Lymphadenopathy:  
  He has no cervical adenopathy. Neurological: He is alert and oriented to person, place, and time. He has normal reflexes. Skin: Skin is warm and dry. No rash noted. Psychiatric: He has a normal mood and affect. His behavior is normal.  
Nursing note and vitals reviewed. ASSESSMENT and PLAN Diagnoses and all orders for this visit: 1. Routine general medical examination at a health care facility 
-     URINALYSIS W/ RFLX MICROSCOPIC 2. Mixed hyperlipidemia- Check lipid panel and appropriate studies to rule out med side effects yearly. 3. Screen for colon cancer 
-     COLOGUARD TEST (FECAL DNA COLORECTAL CANCER SCREENING) 4. Encounter for immunization 
-     PNEUMOCOCCAL POLYSACCHARIDE VACCINE, 23-VALENT, ADULT OR IMMUNOSUPPRESSED PT DOSE,

## 2019-03-01 LAB
APPEARANCE UR: CLEAR
BILIRUB UR QL STRIP: NEGATIVE
COLOR UR: YELLOW
GLUCOSE UR QL: NEGATIVE
HGB UR QL STRIP: NEGATIVE
KETONES UR QL STRIP: NEGATIVE
LEUKOCYTE ESTERASE UR QL STRIP: NEGATIVE
MICRO URNS: NORMAL
NITRITE UR QL STRIP: NEGATIVE
PH UR STRIP: 5 [PH] (ref 5–7.5)
PROT UR QL STRIP: NEGATIVE
SP GR UR: 1.02 (ref 1–1.03)
UROBILINOGEN UR STRIP-MCNC: 0.2 MG/DL (ref 0.2–1)

## 2020-01-13 ENCOUNTER — OFFICE VISIT (OUTPATIENT)
Dept: INTERNAL MEDICINE CLINIC | Age: 65
End: 2020-01-13

## 2020-01-13 VITALS
HEART RATE: 81 BPM | WEIGHT: 239.8 LBS | DIASTOLIC BLOOD PRESSURE: 83 MMHG | HEIGHT: 67 IN | TEMPERATURE: 97.6 F | OXYGEN SATURATION: 97 % | BODY MASS INDEX: 37.64 KG/M2 | RESPIRATION RATE: 20 BRPM | SYSTOLIC BLOOD PRESSURE: 135 MMHG

## 2020-01-13 DIAGNOSIS — J06.9 VIRAL URI: ICD-10-CM

## 2020-01-13 DIAGNOSIS — K29.50 OTHER CHRONIC GASTRITIS WITHOUT HEMORRHAGE: Primary | ICD-10-CM

## 2020-01-13 DIAGNOSIS — M62.08 DIASTASIS RECTI: ICD-10-CM

## 2020-01-13 RX ORDER — OMEPRAZOLE 20 MG/1
20 TABLET, DELAYED RELEASE ORAL DAILY
Qty: 14 TAB | Refills: 0
Start: 2020-01-13 | End: 2020-01-27

## 2020-01-13 RX ORDER — GUAIFENESIN 600 MG/1
600 TABLET, EXTENDED RELEASE ORAL 2 TIMES DAILY
Qty: 28 TAB | Refills: 0
Start: 2020-01-13 | End: 2020-01-27

## 2020-01-13 NOTE — PROGRESS NOTES
HISTORY OF PRESENT ILLNESS  Carmine Steele is a 75 y.o. male. Epigastric Pain    The history is provided by the patient. This is a new problem. Episode onset: 2 months. The problem occurs daily. The problem has not changed since onset. The pain is associated with an unknown (concerned about possible hernia) factor. The pain is moderate. Associated symptoms include nausea. Pertinent negatives include no anorexia, no diarrhea, no hematochezia, no melena, no vomiting and no constipation. The pain is worsened by eating. The pain is relieved by nothing (has tried no medication). Past workup includes colonoscopy. Past workup includes no CT scan, no ultrasound. His past medical history does not include PUD, gallstones or GERD. The patient's surgical history non-contributory. URI    The history is provided by the patient. This is a new problem. Episode onset: 2 wks. The problem has been gradually improving. There has been no fever. Associated symptoms include nausea, congestion and cough. Pertinent negatives include no diarrhea and no vomiting. He has tried nothing for the symptoms. The treatment provided mild relief. Review of Systems   HENT: Positive for congestion. Respiratory: Positive for cough. Gastrointestinal: Positive for nausea. Negative for anorexia, constipation, diarrhea, hematochezia, melena and vomiting. Physical Exam  Vitals signs and nursing note reviewed. Constitutional:       General: He is not in acute distress. Cardiovascular:      Rate and Rhythm: Normal rate and regular rhythm. Heart sounds: No murmur. No friction rub. No gallop. Pulmonary:      Effort: Pulmonary effort is normal.      Breath sounds: Wheezing present. No rales. Comments: Trace only  Abdominal:      General: Abdomen is flat. There is no distension. Palpations: There is no hepatomegaly or mass. Tenderness: There is tenderness in the epigastric area. Hernia: No hernia is present.  There is no hernia in the ventral area. ASSESSMENT and PLAN  Diagnoses and all orders for this visit:    1. Other chronic gastritis without hemorrhage  -     omeprazole (PRILOSEC OTC) 20 mg tablet; Take 1 Tab by mouth daily for 14 days. - If sx persist likely proceed with US    2. Viral URI  -     guaiFENesin ER (MUCINEX) 600 mg ER tablet; Take 1 Tab by mouth two (2) times a day for 14 days.     3. Diastasis recti- reassured

## 2020-01-13 NOTE — PROGRESS NOTES
Pt states has experiencing abdominal pain x2 months. Worse after he eats. Also notes when straining he can see bulge in abdomen. Feels he may have a hernia.

## 2020-03-09 RX ORDER — ATORVASTATIN CALCIUM 20 MG/1
TABLET, FILM COATED ORAL
Qty: 30 TAB | Refills: 7 | Status: SHIPPED | OUTPATIENT
Start: 2020-03-09 | End: 2021-01-05

## 2020-06-17 ENCOUNTER — OFFICE VISIT (OUTPATIENT)
Dept: INTERNAL MEDICINE CLINIC | Age: 65
End: 2020-06-17

## 2020-06-17 VITALS
DIASTOLIC BLOOD PRESSURE: 75 MMHG | OXYGEN SATURATION: 97 % | RESPIRATION RATE: 16 BRPM | SYSTOLIC BLOOD PRESSURE: 112 MMHG | BODY MASS INDEX: 38.92 KG/M2 | WEIGHT: 248 LBS | TEMPERATURE: 97.8 F | HEART RATE: 87 BPM | HEIGHT: 67 IN

## 2020-06-17 DIAGNOSIS — Z00.00 INITIAL MEDICARE ANNUAL WELLNESS VISIT: Primary | ICD-10-CM

## 2020-06-17 DIAGNOSIS — R07.89 CHEST WALL PAIN: ICD-10-CM

## 2020-06-17 DIAGNOSIS — Z12.5 PROSTATE CANCER SCREENING: ICD-10-CM

## 2020-06-17 DIAGNOSIS — L91.8 SKIN TAGS, MULTIPLE ACQUIRED: ICD-10-CM

## 2020-06-17 DIAGNOSIS — E78.2 MIXED HYPERLIPIDEMIA: ICD-10-CM

## 2020-06-17 DIAGNOSIS — B35.3 TINEA PEDIS OF BOTH FEET: ICD-10-CM

## 2020-06-17 DIAGNOSIS — Z12.11 SCREEN FOR COLON CANCER: ICD-10-CM

## 2020-06-17 DIAGNOSIS — K29.50 OTHER CHRONIC GASTRITIS WITHOUT HEMORRHAGE: ICD-10-CM

## 2020-06-17 DIAGNOSIS — R20.9 SENSORY DISTURBANCE: ICD-10-CM

## 2020-06-17 RX ORDER — BISMUTH SUBSALICYLATE 262 MG
1 TABLET,CHEWABLE ORAL DAILY
COMMUNITY
End: 2021-09-26

## 2020-06-17 NOTE — PATIENT INSTRUCTIONS
Medicare Wellness Visit, Male The best way to live healthy is to have a lifestyle where you eat a well-balanced diet, exercise regularly, limit alcohol use, and quit all forms of tobacco/nicotine, if applicable. Regular preventive services are another way to keep healthy. Preventive services (vaccines, screening tests, monitoring & exams) can help personalize your care plan, which helps you manage your own care. Screening tests can find health problems at the earliest stages, when they are easiest to treat. [facility] follows the current, evidence-based guidelines published by the [facility] El[facility] Aline ([facility]) when recommending preventive services for our patients. Because we follow these guidelines, sometimes recommendations change over time as research supports it. (For example, a prostate screening blood test is no longer routinely recommended for men with no symptoms). Of course, you and your doctor may decide to screen more often for some diseases, based on your risk and co-morbidities (chronic disease you are already diagnosed with). Preventive services for you include: - Medicare offers their members a free annual wellness visit, which is time for you and your primary care provider to discuss and plan for your preventive service needs. Take advantage of this benefit every year! 
-All adults over age 72 should receive the recommended pneumonia vaccines. Current USPSTF guidelines recommend a series of two vaccines for the best pneumonia protection.  
-All adults should have a flu vaccine yearly and tetanus vaccine every 10 years. 
-All adults age 48 and older should receive the shingles vaccines (series of two vaccines).       
-All adults age 38-68 who are overweight should have a diabetes screening test once every three years.  
-Other screening tests & preventive services for persons with diabetes include: an eye exam to screen for diabetic retinopathy, a kidney function test, a foot exam, and stricter control over your cholesterol.  
-Cardiovascular screening for adults with routine risk involves an electrocardiogram (ECG) at intervals determined by the provider.  
-Colorectal cancer screening should be done for adults age 54-65 with no increased risk factors for colorectal cancer. There are a number of acceptable methods of screening for this type of cancer. Each test has its own benefits and drawbacks. Discuss with your provider what is most appropriate for you during your annual wellness visit. The different tests include: colonoscopy (considered the best screening method), a fecal occult blood test, a fecal DNA test, and sigmoidoscopy. 
-All adults born between [facility] should be screened once for Hepatitis C. 
-An Abdominal Aortic Aneurysm (AAA) Screening is recommended for men age 73-68 who has ever smoked in their lifetime. Here is a list of your current Health Maintenance items (your personalized list of preventive services) with a due date: 
Health Maintenance Due Topic Date Due  Glaucoma Screening   09/17/2018  Cholesterol Test   02/14/2020

## 2020-06-17 NOTE — PROGRESS NOTES
HISTORY OF PRESENT ILLNESS  Calhoun Cockayne is a 75 y.o. male. HPI Subjective:  Benito Driver is seen today for a complete exam, wellness visit and follow up of chronic problems. He also has lots of different questions. 1. Preventive medicine, fully reviewed today. See attached note for wellness visit. He is due for routine labs. He is up to date with vaccinations and colon cancer screening with Cologuard. 2. Chronic problems are reviewed. a. Hyperlipidemia. He is due for routine labs. 3. New problems and other questions. a. He continues with abdominal pain. It really did not improve with medication treatment. We will refer to GI.  b. 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 We will send in [address]. c. He has a sensation of insects crawling over him. This is an unusual sensory disturbance, but has been persistent. We will refer for a neurology consultation. d. He has bothersome skin tags that become irritated in his armpits. Refer to dermatology. e. He has random left upper chest pain that has been present for years. It tends to come and go. It worsens when he exhales. There is no exertional component. We will check a chest x-ray. I do not think there is any  cardiac element to this. f. Weight gain. Encouraged diet and exercise. Social History:  Notable for him having retired. Review of Systems   Constitutional: Negative for chills and fever. HENT: Negative for hearing loss and sore throat. Respiratory: Negative for cough and shortness of breath. Cardiovascular: Positive for chest pain. Gastrointestinal: Positive for abdominal pain. Skin: Positive for rash. Neurological: Positive for tingling and sensory change. Physical Exam  Vitals signs and nursing note reviewed. Constitutional:       General: He is not in acute distress. Appearance: He is well-developed. HENT:      Head: Normocephalic and atraumatic.       Right Ear: Tympanic membrane, ear canal and external ear normal.      Left Ear: Tympanic membrane, ear canal and external ear normal.   Eyes:      General:         Right eye: No discharge. Left eye: No discharge. Pupils: Pupils are equal, round, and reactive to light. Neck:      Musculoskeletal: Normal range of motion and neck supple. Thyroid: No thyromegaly. Vascular: No carotid bruit. Cardiovascular:      Rate and Rhythm: Normal rate and regular rhythm. Heart sounds: Normal heart sounds. No murmur. No friction rub. No gallop. Pulmonary:      Effort: Pulmonary effort is normal. No respiratory distress. Breath sounds: Normal breath sounds. No wheezing or rales. Abdominal:      General: Bowel sounds are normal. There is no distension. Palpations: Abdomen is soft. There is no mass. Tenderness: There is generalized abdominal tenderness. There is no guarding or rebound. Musculoskeletal: Normal range of motion. General: No tenderness. Lymphadenopathy:      Cervical: No cervical adenopathy. Skin:     General: Skin is warm and dry. Findings: No rash. Neurological:      Mental Status: He is alert and oriented to person, place, and time. Deep Tendon Reflexes: Reflexes are normal and symmetric. Psychiatric:         Behavior: Behavior normal.         ASSESSMENT and PLAN  Diagnoses and all orders for this visit:    1. Initial Medicare annual wellness visit    2. Mixed hyperlipidemia  -     CBC WITH AUTOMATED DIFF  -     LIPID PANEL  -     TSH 3RD GENERATION  -     METABOLIC PANEL, COMPREHENSIVE  -     URINALYSIS W/ RFLX MICROSCOPIC    3. Screen for colon cancer- up to date    3. Other chronic gastritis without hemorrhage  -     REFERRAL TO GASTROENTEROLOGY    5. Sensory disturbance  -     REFERRAL TO NEUROLOGY    6. Tinea pedis of both feet- refill Lotrisone     7. Skin tags, multiple acquired  -     REFERRAL TO DERMATOLOGY    8. Chest wall pain  -     XR CHEST PA LAT;  Future  -     AMB POC EKG ROUTINE W/ 12 LEADS, INTER & REP    9.  Prostate cancer screening  -     PSA SCREENING (SCREENING)

## 2020-06-17 NOTE — PROGRESS NOTES
This is an Initial Medicare Annual Wellness Exam (AWV) (Performed 12 months after IPPE or effective date of Medicare Part B enrollment, Once in a lifetime)    I have reviewed the patient's medical history in detail and updated the computerized patient record. History     Patient Active Problem List   Diagnosis Code    Displacement of cervical intervertebral disc without myelopathy M50.20    Osteoarthrosis, unspecified whether generalized or localized, shoulder region M19.019    Pain in joint, site unspecified M25.50    Allergic rhinitis, cause unspecified J30.9    Mixed hyperlipidemia E78.2    Severe obesity (BMI 35.0-39. 9) with comorbidity ([facility] [address] E66.01     History reviewed. No pertinent past medical history. Past Surgical History:   Procedure Laterality Date    ABDOMEN SURGERY PROC UNLISTED      HX APPENDECTOMY  76    HX CATARACT REMOVAL       Current Outpatient Medications   Medication Sig Dispense Refill    multivitamin (ONE A DAY) tablet Take 1 Tab by mouth daily.  atorvastatin (LIPITOR) 20 mg tablet TAKE 1 TABLET BY MOUTH DAILY 30 Tab 7    aspirin delayed-release (ASPIR-81) 81 mg tablet Take 81 mg by mouth daily. Does not take daily. Allergies   Allergen Reactions    Latex, Natural Rubber Itching       Family History   Problem Relation Age of Onset    Psychiatric Disorder Mother         alzheimer's, other psychiatric problems    Parkinsonism Mother     Heart Disease Father     Stroke Father     Pulmonary Embolism Father     Lung Disease Father         black lung    Heart Disease Son     Diabetes Sister     Cancer Brother         prostate cancer    Other Sister         eye problem -detached retna      Social History     Tobacco Use    Smoking status: Never Smoker    Smokeless tobacco: Never Used   Substance Use Topics    Alcohol use:  Yes     Alcohol/week: 0.0 standard drinks     Comment: 1 per month       Depression Risk Factor Screening:     3 most recent PHQ Screens 6/17/2020   Little interest or pleasure in doing things Not at all   Feeling down, depressed, irritable, or hopeless Not at all   Total Score PHQ 2 0   Trouble falling or staying asleep, or sleeping too much -   Feeling tired or having little energy -   Poor appetite, weight loss, or overeating -   Feeling bad about yourself - or that you are a failure or have let yourself or your family down -   Trouble concentrating on things such as school, work, reading, or watching TV -   Moving or speaking so slowly that other people could have noticed; or the opposite being so fidgety that others notice -   Thoughts of being better off dead, or hurting yourself in some way -   PHQ 9 Score -   How difficult have these problems made it for you to do your work, take care of your home and get along with others -       Alcohol Risk Factor Screening (MALE > 65): Do you average more 1 drink per night or more than 7 drinks a week: No    In the past three months have you have had more than 4 drinks containing alcohol on one occasion: No      Functional Ability and Level of Safety:   Hearing: Hearing is good. Activities of Daily Living: The home contains: no safety equipment. Patient does total self care    Ambulation: with no difficulty      Fall Risk:  Fall Risk Assessment, last 12 mths 6/17/2020   Able to walk? Yes   Fall in past 12 months? No   Fall with injury? -   Number of falls in past 12 months -   Fall Risk Score -     Abuse Screen:  Patient is not abused       Cognitive Screening   Has your family/caregiver stated any concerns about your memory: no         Patient Care Team   Patient Care Team:  Balta Ag MD as PCP - General  Balta Ag MD as PCP [facility]    Assessment/Plan   Education and counseling provided:  Are appropriate based on today's review and evaluation    Diagnoses and all orders for this visit:    1.  Initial Medicare annual wellness visit         Health Maintenance Due   Topic Date Due    GLAUCOMA SCREENING Q2Y  09/17/2018    Lipid Screen  02/14/2020

## 2020-06-17 NOTE — PROGRESS NOTES
Verified name and birth date for privacy precautions. Chart reviewed in preparation for today's visit.      Chief Complaint   Patient presents with    Complete Physical    Abdominal Pain     after taking cholesterol medicine           Health Maintenance Due   Topic    GLAUCOMA SCREENING Q2Y     Lipid Screen          Wt Readings from Last 3 Encounters:   06/17/20 248 lb (112.5 kg)   01/13/20 239 lb 12.8 oz (108.8 kg)   02/28/19 238 lb (108 kg)     Temp Readings from Last 3 Encounters:   06/17/20 97.8 °F (36.6 °C) (Temporal)   01/13/20 97.6 °F (36.4 °C)   02/28/19 97.6 °F (36.4 °C) (Oral)     BP Readings from Last 3 Encounters:   06/17/20 112/75   01/13/20 135/83   02/28/19 110/78     Pulse Readings from Last 3 Encounters:   06/17/20 87   01/13/20 81   02/28/19 91         Learning Assessment:  :     Learning Assessment 12/5/2017 5/20/2014   PRIMARY LEARNER Patient Patient   PRIMARY LANGUAGE ENGLISH ENGLISH   LEARNER PREFERENCE PRIMARY OTHER (COMMENT) DEMONSTRATION   ANSWERED BY patient patient   RELATIONSHIP SELF SELF       Depression Screening:  :     3 most recent PHQ Screens 6/17/2020   Little interest or pleasure in doing things Not at all   Feeling down, depressed, irritable, or hopeless Not at all   Total Score PHQ 2 0   Trouble falling or staying asleep, or sleeping too much -   Feeling tired or having little energy -   Poor appetite, weight loss, or overeating -   Feeling bad about yourself - or that you are a failure or have let yourself or your family down -   Trouble concentrating on things such as school, work, reading, or watching TV -   Moving or speaking so slowly that other people could have noticed; or the opposite being so fidgety that others notice -   Thoughts of being better off dead, or hurting yourself in some way -   PHQ 9 Score -   How difficult have these problems made it for you to do your work, take care of your home and get along with others -       Fall Risk Assessment:  :     Fall Risk Assessment, last 12 mths 6/17/2020   Able to walk? Yes   Fall in past 12 months? No   Fall with injury? -   Number of falls in past 12 months -   Fall Risk Score -       Abuse Screening:  :     Abuse Screening Questionnaire 6/17/2020   Do you ever feel afraid of your partner? N   Are you in a relationship with someone who physically or mentally threatens you? N   Is it safe for you to go home?  Amie Godfrey

## 2020-06-24 LAB
ALBUMIN SERPL-MCNC: 4.3 G/DL (ref 3.8–4.8)
ALBUMIN/GLOB SERPL: 1.8 {RATIO} (ref 1.2–2.2)
ALP SERPL-CCNC: 53 IU/L (ref 39–117)
ALT SERPL-CCNC: 15 IU/L (ref 0–44)
APPEARANCE UR: CLEAR
AST SERPL-CCNC: 18 IU/L (ref 0–40)
BASOPHILS # BLD AUTO: 0.1 X10E3/UL (ref 0–0.2)
BASOPHILS NFR BLD AUTO: 1 %
BILIRUB SERPL-MCNC: 0.9 MG/DL (ref 0–1.2)
BILIRUB UR QL STRIP: NEGATIVE
BUN SERPL-MCNC: 16 MG/DL (ref 8–27)
BUN/CREAT SERPL: 14 (ref 10–24)
CALCIUM SERPL-MCNC: 9.5 MG/DL (ref 8.6–10.2)
CHLORIDE SERPL-SCNC: 103 MMOL/L (ref 96–106)
CHOLEST SERPL-MCNC: 144 MG/DL (ref 100–199)
CO2 SERPL-SCNC: 23 MMOL/L (ref 20–29)
COLOR UR: YELLOW
CREAT SERPL-MCNC: 1.15 MG/DL (ref 0.76–1.27)
EOSINOPHIL # BLD AUTO: 0.3 X10E3/UL (ref 0–0.4)
EOSINOPHIL NFR BLD AUTO: 4 %
ERYTHROCYTE [DISTWIDTH] IN BLOOD BY AUTOMATED COUNT: 12.5 % (ref 11.6–15.4)
GLOBULIN SER CALC-MCNC: 2.4 G/DL (ref 1.5–4.5)
GLUCOSE SERPL-MCNC: 97 MG/DL (ref 65–99)
GLUCOSE UR QL: NEGATIVE
HCT VFR BLD AUTO: 44.7 % (ref 37.5–51)
HDLC SERPL-MCNC: 47 MG/DL
HGB BLD-MCNC: 15 G/DL (ref 13–17.7)
HGB UR QL STRIP: NEGATIVE
IMM GRANULOCYTES # BLD AUTO: 0 X10E3/UL (ref 0–0.1)
IMM GRANULOCYTES NFR BLD AUTO: 0 %
KETONES UR QL STRIP: NEGATIVE
LDLC SERPL CALC-MCNC: 72 MG/DL (ref 0–99)
LEUKOCYTE ESTERASE UR QL STRIP: NEGATIVE
LYMPHOCYTES # BLD AUTO: 2.2 X10E3/UL (ref 0.7–3.1)
LYMPHOCYTES NFR BLD AUTO: 30 %
MCH RBC QN AUTO: 30 PG (ref 26.6–33)
MCHC RBC AUTO-ENTMCNC: 33.6 G/DL (ref 31.5–35.7)
MCV RBC AUTO: 89 FL (ref 79–97)
MICRO URNS: NORMAL
MONOCYTES # BLD AUTO: 0.6 X10E3/UL (ref 0.1–0.9)
MONOCYTES NFR BLD AUTO: 9 %
NEUTROPHILS # BLD AUTO: 4.2 X10E3/UL (ref 1.4–7)
NEUTROPHILS NFR BLD AUTO: 56 %
NITRITE UR QL STRIP: NEGATIVE
PH UR STRIP: 5 [PH] (ref 5–7.5)
PLATELET # BLD AUTO: 193 X10E3/UL (ref 150–450)
POTASSIUM SERPL-SCNC: 4.8 MMOL/L (ref 3.5–5.2)
PROT SERPL-MCNC: 6.7 G/DL (ref 6–8.5)
PROT UR QL STRIP: NEGATIVE
PSA SERPL-MCNC: 0.7 NG/ML (ref 0–4)
RBC # BLD AUTO: 5 X10E6/UL (ref 4.14–5.8)
SODIUM SERPL-SCNC: 140 MMOL/L (ref 134–144)
SP GR UR: 1.02 (ref 1–1.03)
TRIGL SERPL-MCNC: 123 MG/DL (ref 0–149)
TSH SERPL DL<=0.005 MIU/L-ACNC: 1.74 UIU/ML (ref 0.45–4.5)
UROBILINOGEN UR STRIP-MCNC: 0.2 MG/DL (ref 0.2–1)
VLDLC SERPL CALC-MCNC: 25 MG/DL (ref 5–40)
WBC # BLD AUTO: 7.4 X10E3/UL (ref 3.4–10.8)

## 2020-06-25 ENCOUNTER — OFFICE VISIT (OUTPATIENT)
Dept: NEUROLOGY | Age: 65
End: 2020-06-25

## 2020-06-25 ENCOUNTER — HOSPITAL ENCOUNTER (OUTPATIENT)
Dept: GENERAL RADIOLOGY | Age: 65
Discharge: HOME | End: 2020-06-25
Attending: INTERNAL MEDICINE
Payer: MEDICARE

## 2020-06-25 VITALS
DIASTOLIC BLOOD PRESSURE: 82 MMHG | OXYGEN SATURATION: 97 % | SYSTOLIC BLOOD PRESSURE: 122 MMHG | WEIGHT: 248 LBS | HEIGHT: 67 IN | RESPIRATION RATE: 16 BRPM | BODY MASS INDEX: 38.92 KG/M2 | HEART RATE: 72 BPM

## 2020-06-25 DIAGNOSIS — R07.89 CHEST WALL PAIN: ICD-10-CM

## 2020-06-25 DIAGNOSIS — G62.9 PERIPHERAL POLYNEUROPATHY: ICD-10-CM

## 2020-06-25 DIAGNOSIS — R20.2 PARESTHESIA: Primary | ICD-10-CM

## 2020-06-25 PROCEDURE — 71046 X-RAY EXAM CHEST 2 VIEWS: CPT

## 2020-06-25 NOTE — PROGRESS NOTES
Mr. Allen Dus presents as a new patient for evaluation of paresthesias of head/face and all extremities. Depression screening done on patient.

## 2020-06-25 NOTE — PROGRESS NOTES
Chief Complaint   Patient presents with    Neurologic Problem         HISTORY OF PRESENT ILLNESS  Chava Rawls is a 75 y.o. male who came in for neurological consultation requested by Dr. Nneka Olguin.  5 years or so he has been experiencing tingling sensation, pins and needle type feeling at random areas of his arms and legs. He feels the sensation, then touches it and it will usually go away. Recently he has been noticing it on his cheeks and scalp as well. He reports a general sense of weakness but denies any focal deficits. Occasionally gets a headache, tension type especially if he is working outside on a hot day. They occur anywhere from 1 to 2-3 times per month and have been occurring for a long time. Denies dizziness, changes in vision, speech, swallowing difficulties. He does report poor balance and becomes imbalanced easily if he has his eyes closed inside the shower or it is dark outside. Denies any history of diabetes but has a strong family history. History reviewed. No pertinent past medical history. Current Outpatient Medications   Medication Sig    multivitamin (ONE A DAY) tablet Take 1 Tab by mouth daily.  atorvastatin (LIPITOR) 20 mg tablet TAKE 1 TABLET BY MOUTH DAILY    aspirin delayed-release (ASPIR-81) 81 mg tablet Take 81 mg by mouth daily. Does not take daily. No current facility-administered medications for this visit.       Allergies   Allergen Reactions    Latex, Natural Rubber Itching     Family History   Problem Relation Age of Onset    Psychiatric Disorder Mother         alzheimer's, other psychiatric problems    Parkinsonism Mother     Heart Disease Father     Stroke Father     Pulmonary Embolism Father     Lung Disease Father         black lung    Heart Disease Son     Diabetes Sister     Cancer Brother         prostate cancer    Other Sister         eye problem -detached retna      Social History     Tobacco Use    Smoking status: Never Smoker    Smokeless tobacco: Never Used   Substance Use Topics    Alcohol use: Yes     Alcohol/week: 0.0 standard drinks     Comment: 1 per month    Drug use: No     Past Surgical History:   Procedure Laterality Date    ABDOMEN SURGERY PROC UNLISTED      HX APPENDECTOMY  74    HX CATARACT REMOVAL           REVIEW OF SYSTEMS  Review of Systems - History obtained from the patient  Psychological ROS: negative  ENT ROS: negative  Hematological and Lymphatic ROS: negative  Endocrine ROS: negative  Respiratory ROS: no cough, shortness of breath, or wheezing  Cardiovascular ROS: no chest pain or dyspnea on exertion  Gastrointestinal ROS: no abdominal pain, change in bowel habits, or black or bloody stools  positive for - abdominal pain  Genito-Urinary ROS: no dysuria, trouble voiding, or hematuria  Musculoskeletal ROS: negative  Dermatological ROS: negative      PHYSICAL EXAMINATION:    Visit Vitals  /82   Pulse 72   Resp 16   Ht 5' 7\" (1.702 m)   Wt 112.5 kg (248 lb)   SpO2 97%   BMI 38.84 kg/m²     General:  Well nourished and groomed individual in no acute distress. Neck: Supple, nontender, no bruits, no pain with resistance to active range of motion. Heart: Regular rate and rhythm. Normal S1S2. Lungs:  Equal chest expansion, no cough, no wheeze  Musculoskeletal:  Extremities revealed no edema and had full range of motion of joints. Psych:  Good mood and bright affect    NEUROLOGICAL EXAMINATION:     Mental Status:   Alert and oriented to person, place, and time with recent and remote memory intact. Attention span and concentration are normal. Speech is fluent. Cranial Nerves:    II, III, IV, VI:  Visual acuity grossly intact. Visual fields are normal.    Pupils are equal, round, and reactive to light and accommodation. Extra-ocular movements are full and fluid. Fundoscopic exam was benign, no ptosis or nystagmus. V-XII: Hearing is grossly intact.   Facial features are symmetric, with normal sensation and strength. The palate rises symmetrically and the tongue protrudes midline. Sternocleidomastoids 5/5. Motor Examination: Normal tone, bulk, and strength. 5/5 muscle strength throughout. No cogwheel rigidity or clonus present. Sensory exam: Impaired distally in both lower extremities to pinprick, temperature, and vibration sense. There is distal to proximal gradient. Impaired proprioception. Coordination:  Finger to nose and rapid arm movement testing was normal.   No resting or intention tremor    Gait and Station:  Steady including tandem walking. Normal arm swing. Positive Rhomberg. No muscle wasting or fasiculations noted. Reflexes:  DTRs 2+ throughout. Toes downgoing. LABS / IMAGING  Lab Results   Component Value Date/Time    WBC 7.4 06/23/2020 09:36 AM    HGB 15.0 06/23/2020 09:36 AM    HCT 44.7 06/23/2020 09:36 AM    PLATELET 337 67/08/5463 09:36 AM    MCV 89 06/23/2020 09:36 AM     Lab Results   Component Value Date/Time    Hemoglobin A1c 5.7 (H) 02/14/2019 05:07 PM     Lab Results   Component Value Date/Time    Sodium 140 06/23/2020 09:36 AM    Potassium 4.8 06/23/2020 09:36 AM    Chloride 103 06/23/2020 09:36 AM    CO2 23 06/23/2020 09:36 AM    Anion gap 9 12/04/2009 05:30 PM    Glucose 97 06/23/2020 09:36 AM    BUN 16 06/23/2020 09:36 AM    Creatinine 1.15 06/23/2020 09:36 AM    BUN/Creatinine ratio 14 06/23/2020 09:36 AM    GFR est AA 76 06/23/2020 09:36 AM    GFR est non-AA 66 06/23/2020 09:36 AM    Calcium 9.5 06/23/2020 09:36 AM    Bilirubin, total 0.9 06/23/2020 09:36 AM    Alk.  phosphatase 53 06/23/2020 09:36 AM    Protein, total 6.7 06/23/2020 09:36 AM    Albumin 4.3 06/23/2020 09:36 AM    Globulin 4.2 (H) 12/04/2009 05:30 PM    A-G Ratio 1.8 06/23/2020 09:36 AM    ALT (SGPT) 15 06/23/2020 09:36 AM    AST (SGOT) 18 06/23/2020 09:36 AM     Lab Results   Component Value Date/Time    TSH 1.740 06/23/2020 09:36 AM       ASSESSMENT    ICD-10-CM ICD-9-CM 1. Paresthesia R20.2 782.0 EMG NCV MOTOR WO F/WAVE PER NERVE      HEMOGLOBIN A1C W/O EAG      VITAMIN B12      GLUCOSE (2 SPEC) TOLERANCE, S   2. Peripheral polyneuropathy G62.9 356.9 EMG NCV MOTOR WO F/WAVE PER NERVE      HEMOGLOBIN A1C W/O EAG      VITAMIN B12      GLUCOSE (2 SPEC) TOLERANCE, S       DISCUSSION  Mr. Audelia Almaraz complains of diffuse paresthesias in different parts of his body for the past 5 years  His clinical exam is suggestive of peripheral neuropathy  I have recommended EMG/NCV for further diagnostic clarification  Repeat hemoglobin A1c and glucose tolerance test.  Also check B12 level  He wishes to defer any pharmacologic therapy for his symptoms at this time but if needed medication such as gabapentin or Cymbalta can be considered  Further recommendations to follow after above tests are completed    Thank you for allowing me to participate in the care of Mr. Char Rodriguez. Please feel free to contact me if you have any questions. I will be happy to follow to follow him along with you. Anthony Wei MD  Diplomate, American Board of Psychiatry & Neurology (Neurology)  [place] Board of Psychiatry & Neurology (Clinical Neurophysiology)  Diplomate, American Board of Electrodiagnostic Medicine    This note will not be viewable in [address]

## 2020-06-25 NOTE — PATIENT INSTRUCTIONS
PRESCRIPTION REFILL POLICY [facility] Statement to Patients April 1, 2014 In an effort to ensure the large volume of patient prescription refills is processed in the most efficient and expeditious manner, we are asking our patients to assist us by calling your Pharmacy for all prescription refills, this will include also your  Mail Order Pharmacy. The pharmacy will contact our office electronically to continue the refill process. Please do not wait until the last minute to call your pharmacy. We need at least 48 hours (2days) to fill prescriptions. We also encourage you to call your pharmacy before going to  your prescription to make sure it is ready. With regard to controlled substance prescription refill requests (narcotic refills) that need to be picked up at our office, we ask your cooperation by providing us with at least 72 hours (3days) notice that you will need a refill. We will not refill narcotic prescription refill requests after 4:00pm on any weekday, Monday through Thursday, or after 2:00pm on Fridays, or on the weekends. We encourage everyone to explore another way of getting your prescription refill request processed using [employer], our patient web portal through our electronic medical record system. [employer] is an efficient and effective way to communicate your medication request directly to the office and  downloadable as an ekta on your smart phone . [employer] also features a review functionality that allows you to view your medication list as well as leave messages for your physician. Are you ready to get connected? If so please review the attatched instructions or speak to any of our staff to get you set up right away! Thank you so much for your cooperation. Should you have any questions please contact our Practice Administrator. The Physicians and Staff,  [facility]

## 2020-06-26 LAB
GLUCOSE 1.5H P 75 G GLC PO SERPL-MCNC: NORMAL MG/DL
GLUCOSE 1H P 75 G GLC PO SERPL-MCNC: 95 MG/DL (ref 65–199)
GLUCOSE 2H P 75 G GLC PO SERPL-MCNC: NORMAL MG/DL
GLUCOSE 30M P 75 G GLC PO SERPL-MCNC: NORMAL MG/DL
GLUCOSE 3H P 75 G GLC PO SERPL-MCNC: NORMAL MG/DL
GLUCOSE 4H P 75 G GLC PO SERPL-MCNC: NORMAL MG/DL
GLUCOSE 5H P 75 G GLC PO SERPL-MCNC: NORMAL MG/DL
GLUCOSE 6H P 75 G GLC PO SERPL-MCNC: NORMAL MG/DL
GLUCOSE P FAST SERPL-MCNC: 90 MG/DL (ref 65–99)
HBA1C MFR BLD: 5.7 % (ref 4.8–5.6)
VIT B12 SERPL-MCNC: 369 PG/ML (ref 232–1245)

## 2020-07-06 ENCOUNTER — OFFICE VISIT (OUTPATIENT)
Dept: NEUROLOGY | Age: 65
End: 2020-07-06

## 2020-07-06 VITALS
WEIGHT: 248 LBS | DIASTOLIC BLOOD PRESSURE: 70 MMHG | SYSTOLIC BLOOD PRESSURE: 120 MMHG | RESPIRATION RATE: 16 BRPM | HEART RATE: 82 BPM | HEIGHT: 67 IN | BODY MASS INDEX: 38.92 KG/M2 | OXYGEN SATURATION: 98 %

## 2020-07-06 DIAGNOSIS — G62.9 POLYNEUROPATHY: ICD-10-CM

## 2020-07-06 NOTE — PROGRESS NOTES
[facility] [facility]  [address]  Phone [phone] Fax [phone]  Test Date:  2020    Patient: Karen Root : 1955 Physician: Radhames Jett MD   Sex: Male Height: 5' 7\" Ref Phys: Radhames Jett MD   ID#: [id number]  Weight: 248 lbs. Technician: Yakelin Lindsay. .EMG TECH     Patient Complaints:  BLE    Patient History / Exam:  49-year-old male who is being evaluated for numbness and cold sensation in different parts of his body, more so in the legs. Being evaluated for peripheral neuropathy    On exam: Alert and fully oriented. Cranial nerves II through XII intact muscle tone above normal per strength normal in all extremities. DTRs 2+ and symmetric on both sides. Sensation impaired to all modalities distally in both feet. Romberg positive. Gait steady      NCV & EMG Findings:  Evaluation of the left Sup Peroneal sensory and the right Sup Peroneal sensory nerves showed no response (14 cm). All remaining nerves  were within normal limits. All left vs. right side differences were within normal limits. All F Wave latencies were within normal limits. All F Wave left vs. right side latency differences were within normal limits. All examined muscles (as indicated in the following table) showed no evidence of electrical instability. Impression:  Peroneal motor responses were low normal amplitude. Tibial motor responses and bilateral sural sensory responses were normal.  Superficial peroneal sensory responses were absent bilaterally. Concentric needle EMG of selected muscles of both legs was normal.    The electrodiagnostic testing is suggestive of mild, distal, sensory greater than motor peripheral neuropathy. No evidence to suggest a lumbosacral radiculopathy on either side.      Recommendations:  Polyneuropathy screening lab work if not already done  PT for gait strengthening    ___________________________  Hillary Lang MD        Nerve Conduction Studies  Anti Sensory Summary Table     Stim Site NR Peak (ms) Norm Peak (ms) P-T Amp (µV) Norm P-T Amp Onset (ms) Site1 Site2 Delta-P (ms) Dist (cm) Parrish (m/s) Norm Parrish (m/s)   Left Sup Peroneal Anti Sensory (Ant Lat Mall)  32.1°C   14 cm NR  <4.4  >5.0  14 cm Ant Lat Mall  10.0  >32   Right Sup Peroneal Anti Sensory (Ant Lat Mall)  32.5°C   14 cm NR  <4.4  >5.0  14 cm Ant Lat Mall  10.0  >32   Left Sural Anti Sensory (Lat Mall)  31.4°C   Calf    3.7 <4.0 10.5 >5.0 3.2 Calf Lat Mall 3.7 14.0 38 >35   Site 2    4.2  10.1  3.3         Right Sural Anti Sensory (Lat Mall)  32°C   Calf    3.8 <4.0 8.1 >5.0 3.3 Calf Lat Mall 3.8 14.0 37 >35   Site 2    3.9  6.5  3.5           Motor Summary Table     Stim Site NR Onset (ms) Norm Onset (ms) O-P Amp (mV) Norm O-P Amp Site1 Site2 Delta-0 (ms) Dist (cm) Parrish (m/s) Norm Parrish (m/s)   Left Peroneal Motor (Ext Dig Brev)  30.2°C   Ankle    5.5 <6.1 4.0 >2.5 B Fib Ankle 8.2 31.0 38 >38   B Fib    13.7  3.5  Poplt B Fib 1.8 10.0 56 >40   Poplt    15.5  3.4          Right Peroneal Motor (Ext Dig Brev)  31.3°C   Ankle    5.1 <6.1 2.7 >2.5 B Fib Ankle 7.9 31.0 39 >38   B Fib    13.0  2.6  Poplt B Fib 2.5 10.0 40 >40   Poplt    15.5  2.6          Left Tibial Motor (Abd Wade Brev)  31.1°C   Ankle    3.6 <6.1 8.9 >3.0 Knee Ankle 10.0 41.0 41 >35   Knee    13.6  6.5          Right Tibial Motor (Abd Wade Brev)  31.9°C   Ankle    3.8 <6.1 11.1 >3.0 Knee Ankle 9.2 40.0 43 >35   Knee    13.0  7.2            F Wave Studies     NR F-Lat (ms) Lat Norm (ms) L-R F-Lat (ms) L-R Lat Norm   Left Tibial (Mrkrs) (Abd Hallucis)  31.5°C      55.98 <61 1.98 <5.7   Right Tibial (Mrkrs) (Abd Hallucis)  32°C      54.00 <61 1.98 <5.7     EMG     Side Muscle Nerve Root Ins Act Fibs Psw Amp Dur Poly Recrt Int Pat Comment   Left AntTibialis Dp Br Peronel L4-5 Nml Nml Nml Nml Nml 0 Nml Nml    Left Peroneus Long Sup Br Peronel L5-S1 Nml Nml Nml Nml Nml 0 Nml Nml    Left Gastroc Tibial S1-2 Nml Nml Nml Nml Nml 0 Nml Nml    Left Flex Dig Long Tibial L5-S2 Nml Nml Nml Nml Nml 0 Nml Nml    Left VastusLat Femoral L2-4 Nml Nml Nml Nml Nml 0 Nml Nml    Right AntTibialis Dp Br Peronel L4-5 Nml Nml Nml Nml Nml 0 Nml Nml    Right Peroneus Long Sup Br Peronel L5-S1 Nml Nml Nml Nml Nml 0 Nml Nml    Right Gastroc Tibial S1-2 Nml Nml Nml Nml Nml 0 Nml Nml    Right Flex Dig Long Tibial L5-S2 Nml Nml Nml Nml Nml 0 Nml Nml    Right VastusLat Femoral L2-4 Nml Nml Nml Nml Nml 0 Nml Nml          Waveforms:

## 2020-08-14 ENCOUNTER — HOSPITAL ENCOUNTER (OUTPATIENT)
Dept: PREADMISSION TESTING | Age: 65
Discharge: HOME | End: 2020-08-14
Payer: MEDICARE

## 2020-08-14 DIAGNOSIS — Z01.812 PRE-PROCEDURE LAB EXAM: ICD-10-CM

## 2020-08-14 PROCEDURE — 87635 SARS-COV-2 COVID-19 AMP PRB: CPT

## 2020-08-15 LAB — SARS-COV-2, COV2NT: NOT DETECTED

## 2020-08-18 ENCOUNTER — ANESTHESIA (OUTPATIENT)
Dept: ENDOSCOPY | Age: 65
End: 2020-08-18
Payer: MEDICARE

## 2020-08-18 ENCOUNTER — HOSPITAL ENCOUNTER (OUTPATIENT)
Age: 65
Setting detail: OUTPATIENT SURGERY
Discharge: HOME | End: 2020-08-18
Attending: SPECIALIST | Admitting: SPECIALIST
Payer: MEDICARE

## 2020-08-18 ENCOUNTER — ANESTHESIA EVENT (OUTPATIENT)
Dept: ENDOSCOPY | Age: 65
End: 2020-08-18
Payer: MEDICARE

## 2020-08-18 VITALS
OXYGEN SATURATION: 94 % | DIASTOLIC BLOOD PRESSURE: 67 MMHG | HEART RATE: 73 BPM | TEMPERATURE: 97.8 F | RESPIRATION RATE: 21 BRPM | HEIGHT: 67 IN | SYSTOLIC BLOOD PRESSURE: 107 MMHG | WEIGHT: 236 LBS | BODY MASS INDEX: 37.04 KG/M2

## 2020-08-18 LAB
H PYLORI FROM TISSUE: NEGATIVE
KIT LOT NO., HCLOLOT: NORMAL
NEGATIVE CONTROL: NEGATIVE
POSITIVE CONTROL: POSITIVE

## 2020-08-18 PROCEDURE — 88305 TISSUE EXAM BY PATHOLOGIST: CPT

## 2020-08-18 PROCEDURE — 74011250637 HC RX REV CODE- 250/637: Performed by: SPECIALIST

## 2020-08-18 PROCEDURE — 74011000250 HC RX REV CODE- 250: Performed by: NURSE ANESTHETIST, CERTIFIED REGISTERED

## 2020-08-18 PROCEDURE — 74011250636 HC RX REV CODE- 250/636: Performed by: NURSE ANESTHETIST, CERTIFIED REGISTERED

## 2020-08-18 PROCEDURE — 77030013992 HC SNR POLYP ENDOSC BSC -B: Performed by: SPECIALIST

## 2020-08-18 PROCEDURE — 87077 CULTURE AEROBIC IDENTIFY: CPT | Performed by: SPECIALIST

## 2020-08-18 PROCEDURE — 77030021593 HC FCPS BIOP ENDOSC BSC -A: Performed by: SPECIALIST

## 2020-08-18 PROCEDURE — 76060000032 HC ANESTHESIA 0.5 TO 1 HR: Performed by: SPECIALIST

## 2020-08-18 PROCEDURE — 76040000007: Performed by: SPECIALIST

## 2020-08-18 PROCEDURE — 74011250636 HC RX REV CODE- 250/636: Performed by: SPECIALIST

## 2020-08-18 RX ORDER — SODIUM CHLORIDE 0.9 % (FLUSH) 0.9 %
5-40 SYRINGE (ML) INJECTION EVERY 8 HOURS
Status: DISCONTINUED | OUTPATIENT
Start: 2020-08-18 | End: 2020-08-18 | Stop reason: HOSPADM

## 2020-08-18 RX ORDER — SODIUM CHLORIDE 9 MG/ML
INJECTION, SOLUTION INTRAVENOUS
Status: DISCONTINUED | OUTPATIENT
Start: 2020-08-18 | End: 2020-08-18 | Stop reason: HOSPADM

## 2020-08-18 RX ORDER — EPINEPHRINE 0.1 MG/ML
1 INJECTION INTRACARDIAC; INTRAVENOUS
Status: DISCONTINUED | OUTPATIENT
Start: 2020-08-18 | End: 2020-08-18 | Stop reason: HOSPADM

## 2020-08-18 RX ORDER — DEXTROMETHORPHAN/PSEUDOEPHED 2.5-7.5/.8
1.2 DROPS ORAL
Status: DISCONTINUED | OUTPATIENT
Start: 2020-08-18 | End: 2020-08-18 | Stop reason: HOSPADM

## 2020-08-18 RX ORDER — LIDOCAINE HYDROCHLORIDE 20 MG/ML
INJECTION, SOLUTION EPIDURAL; INFILTRATION; INTRACAUDAL; PERINEURAL AS NEEDED
Status: DISCONTINUED | OUTPATIENT
Start: 2020-08-18 | End: 2020-08-18 | Stop reason: HOSPADM

## 2020-08-18 RX ORDER — MIDAZOLAM HYDROCHLORIDE 1 MG/ML
.25-5 INJECTION, SOLUTION INTRAMUSCULAR; INTRAVENOUS
Status: DISCONTINUED | OUTPATIENT
Start: 2020-08-18 | End: 2020-08-18 | Stop reason: HOSPADM

## 2020-08-18 RX ORDER — FENTANYL CITRATE 50 UG/ML
12.5-5 INJECTION, SOLUTION INTRAMUSCULAR; INTRAVENOUS
Status: DISCONTINUED | OUTPATIENT
Start: 2020-08-18 | End: 2020-08-18 | Stop reason: HOSPADM

## 2020-08-18 RX ORDER — SODIUM CHLORIDE 9 MG/ML
50 INJECTION, SOLUTION INTRAVENOUS CONTINUOUS
Status: DISCONTINUED | OUTPATIENT
Start: 2020-08-18 | End: 2020-08-18 | Stop reason: HOSPADM

## 2020-08-18 RX ORDER — NALOXONE HYDROCHLORIDE 0.4 MG/ML
0.4 INJECTION, SOLUTION INTRAMUSCULAR; INTRAVENOUS; SUBCUTANEOUS
Status: DISCONTINUED | OUTPATIENT
Start: 2020-08-18 | End: 2020-08-18 | Stop reason: HOSPADM

## 2020-08-18 RX ORDER — PROPOFOL 10 MG/ML
INJECTION, EMULSION INTRAVENOUS AS NEEDED
Status: DISCONTINUED | OUTPATIENT
Start: 2020-08-18 | End: 2020-08-18 | Stop reason: HOSPADM

## 2020-08-18 RX ORDER — ATROPINE SULFATE 0.1 MG/ML
0.5 INJECTION INTRAVENOUS
Status: DISCONTINUED | OUTPATIENT
Start: 2020-08-18 | End: 2020-08-18 | Stop reason: HOSPADM

## 2020-08-18 RX ORDER — FLUMAZENIL 0.1 MG/ML
0.2 INJECTION INTRAVENOUS
Status: DISCONTINUED | OUTPATIENT
Start: 2020-08-18 | End: 2020-08-18 | Stop reason: HOSPADM

## 2020-08-18 RX ORDER — SODIUM CHLORIDE 0.9 % (FLUSH) 0.9 %
5-40 SYRINGE (ML) INJECTION AS NEEDED
Status: DISCONTINUED | OUTPATIENT
Start: 2020-08-18 | End: 2020-08-18 | Stop reason: HOSPADM

## 2020-08-18 RX ADMIN — PROPOFOL 25 MG: 10 INJECTION, EMULSION INTRAVENOUS at 15:29

## 2020-08-18 RX ADMIN — PROPOFOL 50 MG: 10 INJECTION, EMULSION INTRAVENOUS at 15:33

## 2020-08-18 RX ADMIN — PROPOFOL 50 MG: 10 INJECTION, EMULSION INTRAVENOUS at 15:38

## 2020-08-18 RX ADMIN — PROPOFOL 75 MG: 10 INJECTION, EMULSION INTRAVENOUS at 15:25

## 2020-08-18 RX ADMIN — SODIUM CHLORIDE: 900 INJECTION, SOLUTION INTRAVENOUS at 15:29

## 2020-08-18 RX ADMIN — PROPOFOL 50 MG: 10 INJECTION, EMULSION INTRAVENOUS at 15:54

## 2020-08-18 RX ADMIN — LIDOCAINE HYDROCHLORIDE 100 MG: 20 INJECTION, SOLUTION EPIDURAL; INFILTRATION; INTRACAUDAL; PERINEURAL at 15:25

## 2020-08-18 RX ADMIN — PROPOFOL 25 MG: 10 INJECTION, EMULSION INTRAVENOUS at 15:39

## 2020-08-18 RX ADMIN — PROPOFOL 25 MG: 10 INJECTION, EMULSION INTRAVENOUS at 15:30

## 2020-08-18 RX ADMIN — PROPOFOL 25 MG: 10 INJECTION, EMULSION INTRAVENOUS at 15:58

## 2020-08-18 RX ADMIN — PROPOFOL 25 MG: 10 INJECTION, EMULSION INTRAVENOUS at 15:26

## 2020-08-18 RX ADMIN — PROPOFOL 50 MG: 10 INJECTION, EMULSION INTRAVENOUS at 15:28

## 2020-08-18 RX ADMIN — PROPOFOL 25 MG: 10 INJECTION, EMULSION INTRAVENOUS at 15:35

## 2020-08-18 RX ADMIN — SODIUM CHLORIDE: 900 INJECTION, SOLUTION INTRAVENOUS at 15:16

## 2020-08-18 RX ADMIN — PROPOFOL 25 MG: 10 INJECTION, EMULSION INTRAVENOUS at 16:02

## 2020-08-18 RX ADMIN — PROPOFOL 25 MG: 10 INJECTION, EMULSION INTRAVENOUS at 15:40

## 2020-08-18 RX ADMIN — PROPOFOL 25 MG: 10 INJECTION, EMULSION INTRAVENOUS at 15:27

## 2020-08-18 RX ADMIN — PROPOFOL 50 MG: 10 INJECTION, EMULSION INTRAVENOUS at 15:41

## 2020-08-18 RX ADMIN — PROPOFOL 50 MG: 10 INJECTION, EMULSION INTRAVENOUS at 15:47

## 2020-08-18 NOTE — ANESTHESIA POSTPROCEDURE EVALUATION
Post-Anesthesia Evaluation and Assessment    Patient: Alejandrina Mcmullen MRN: [id number]  SSN: [id number]    YOB: 1955  Age: 75 y.o. Sex: male      I have evaluated the patient and they are stable and ready for discharge from the PACU. Cardiovascular Function/Vital Signs  Visit Vitals  /78   Pulse 80   Temp 36.7 °C (98.1 °F)   Resp 18   Ht 5' 7\" (1.702 m)   Wt 107 kg (236 lb)   SpO2 95%   BMI 36.96 kg/m²       Patient is status post MAC anesthesia for Procedure(s):  COLONOSCOPY, EGD  ESOPHAGOGASTRODUODENOSCOPY (EGD)    :-  ESOPHAGOGASTRODUODENAL (EGD) BIOPSY  ENDOSCOPIC POLYPECTOMY. Nausea/Vomiting: None    Postoperative hydration reviewed and adequate. Pain:  Pain Scale 1: Numeric (0 - 10) (08/18/20 1443)  Pain Intensity 1: 1 (08/18/20 1443)   Managed    Neurological Status: At baseline    Mental Status, Level of Consciousness: Alert and  oriented to person, place, and time    Pulmonary Status:   O2 Device: CO2 nasal cannula (08/18/20 1607)   Adequate oxygenation and airway patent    Complications related to anesthesia: None    Post-anesthesia assessment completed. No concerns    Signed By: Haja Hdz MD     August 18, 2020              Procedure(s):  COLONOSCOPY, EGD  ESOPHAGOGASTRODUODENOSCOPY (EGD)    :-  ESOPHAGOGASTRODUODENAL (EGD) BIOPSY  ENDOSCOPIC POLYPECTOMY. MAC    <BSHSIANPOST>    INITIAL Post-op Vital signs: No vitals data found for the desired time range.

## 2020-08-18 NOTE — DISCHARGE INSTRUCTIONS
Serg Champ  [id number]  1955    COLON DISCHARGE INSTRUCTIONS  Discomfort:  Redness at IV site- apply warm compress to area; if redness or soreness persist- contact your physician  There may be a slight amount of blood passed from the rectum  Gaseous discomfort- walking, belching will help relieve any discomfort  You may not operate a vehicle for 12 hours  You may not engage in an occupation involving machinery or appliances for rest of today  You may not drink alcoholic beverages for at least 12 hours  Avoid making any critical decisions for at least 24 hour  DIET:   Regular diet. - however -  remember your colon is empty and a heavy meal will produce gas. Avoid these foods:  vegetables, fried / greasy foods, carbonated drinks for today. ACTIVITY:  You may resume your normal daily activities it is recommended that you spend the remainder of the day resting -  avoid any strenuous activity. CALL M.D. ANY SIGN OF:  Increasing pain, nausea, vomiting  Abdominal distension (swelling)  New increased bleeding (oral or rectal)  Fever (chills)  Pain in chest area  Bloody discharge from nose or mouth  Shortness of breath    You may  take any Advil, Aspirin, Ibuprofen, Motrin, Aleve, Goodys, or any similar pain or arthritis products for 10 days, ONLY  Tylenol as needed for pain.       Follow-up Instructions:   Call Dr. Susie Boswell  Results of procedure / biopsy in 10-14days   Office telephone for problems or questions [phone]    Recommendation for next colonscopy in 3 years  If < 10 years, reason:  above average risk patient   Call office to schedule CT scan of the abdomen and pelvis

## 2020-08-18 NOTE — H&P
Pre-endoscopy H and P for Colonoscopy    The patient was seen and examined. Date of last colonoscopy: never, Polyps  No      The airway was assessed and documented. The problem list, past medical history, and medications were reviewed. Patient Active Problem List   Diagnosis Code    Displacement of cervical intervertebral disc without myelopathy M50.20    Osteoarthrosis, unspecified whether generalized or localized, shoulder region M19.019    Pain in joint, site unspecified M25.50    Allergic rhinitis, cause unspecified J30.9    Mixed hyperlipidemia E78.2    Severe obesity (BMI 35.0-39. 9) with comorbidity ([facility] [address] E66.01     Social History     Socioeconomic History    Marital status:      Spouse name: Not on file    Number of children: Not on file    Years of education: Not on file    Highest education level: Not on file   Occupational History    Not on file   Social Needs    Financial resource strain: Not on file    Food insecurity     Worry: Not on file     Inability: Not on file    Transportation needs     Medical: Not on file     Non-medical: Not on file   Tobacco Use    Smoking status: Never Smoker    Smokeless tobacco: Never Used   Substance and Sexual Activity    Alcohol use:  Yes     Alcohol/week: 0.0 standard drinks     Comment: 1 per month    Drug use: No    Sexual activity: Not on file   Lifestyle    Physical activity     Days per week: Not on file     Minutes per session: Not on file    Stress: Not on file   Relationships    Social connections     Talks on phone: Not on file     Gets together: Not on file     Attends Sabianist service: Not on file     Active member of club or organization: Not on file     Attends meetings of clubs or organizations: Not on file     Relationship status: Not on file    Intimate partner violence     Fear of current or ex partner: Not on file     Emotionally abused: Not on file     Physically abused: Not on file     Forced sexual activity: Not on file   Other Topics Concern    Not on file   Social History Narrative    Not on file     Past Medical History:   Diagnosis Date    Ill-defined condition     cellulitis, affects joints - treated intermittently    Ill-defined condition     pre cancerous cells on nose, treated    Ill-defined condition     diastasis rect     The patient has a family history of na    Prior to Admission Medications   Prescriptions Last Dose Informant Patient Reported? Taking?   aspirin delayed-release (ASPIR-81) 81 mg tablet 8/12/2020  Yes No   Sig: Take 81 mg by mouth daily. Does not take daily. atorvastatin (LIPITOR) 20 mg tablet 8/12/2020 at Unknown time  No Yes   Sig: TAKE 1 TABLET BY MOUTH DAILY   multivitamin (ONE A DAY) tablet 8/12/2020  Yes No   Sig: Take 1 Tab by mouth daily. Facility-Administered Medications: None         The review of systems is:  negative for shortness of breath or chest pain      The heart, lungs and mental status were satisfactory for the administration of MAC sedation and for the procedure. Mallampati score: See Anesthesia. I discussed with the patient the objectives, risks, consequences and alternatives to the procedure. Plan: Endoscopic procedure with MAC sedation.     Deni Abraham MD  8/18/2020  3:21 PM

## 2020-08-18 NOTE — ANESTHESIA PREPROCEDURE EVALUATION
Relevant Problems   No relevant active problems       Anesthetic History   No history of anesthetic complications            Review of Systems / Medical History  Patient summary reviewed, nursing notes reviewed and pertinent labs reviewed    Pulmonary  Within defined limits                 Neuro/Psych   Within defined limits           Cardiovascular  Within defined limits                Exercise tolerance: >4 METS     GI/Hepatic/Renal  Within defined limits              Endo/Other        Obesity and arthritis     Other Findings              Physical Exam    Airway  Mallampati: II  TM Distance: > 6 cm  Neck ROM: normal range of motion   Mouth opening: Normal     Cardiovascular  Regular rate and rhythm,  S1 and S2 normal,  no murmur, click, rub, or gallop             Dental  No notable dental hx       Pulmonary  Breath sounds clear to auscultation               Abdominal  GI exam deferred       Other Findings            Anesthetic Plan    ASA: 2  Anesthesia type: MAC            Anesthetic plan and risks discussed with: Patient

## 2020-08-19 NOTE — PROCEDURES
EGD Procedure Note    Indications:  GERD, aspirin use, worsening symptoms of GERD, epigastric pain and tenderness   Referring Physician: Bharat Rowland MD   Anesthesia/Sedation:MAC  Endoscopist:  Dr. Cephas Gowers  Assistant:  Endoscopy Technician-1: Ginna CORONA  Endoscopy RN-1: Jed Ledesma, RN; Juanita Choudhary  Surgical Assistant: None  Implants: None      Preoperative diagnosis: SCREENING, GERD    Postoperative diagnosis: 1.questionable mass cardia of stomach  2.hiatal hernia  3.gastritis  4.cecal polyp  5.hepatic flexure polyps x 2  6.sigmoid colon polyps x 3      Procedure in Detail:  Informed consent was obtained for the procedure, including sedation. Risks of perforation, hemorrhage, adverse drug reaction, and aspiration were discussed. The patient was placed in the left lateral decubitus position. Based on the pre-procedure assessment, including review of the patient's medical history, medications, allergies, and review of systems, he had been deemed to be an appropriate candidate for moderate sedation; he was therefore sedated with the medications listed above. The patient was monitored continuously with ECG tracing, pulse oximetry, blood pressure monitoring, and direct observations. An Olympus video endoscope was inserted into the mouth and advanced under direct vision to into the esophagus, then stomach and duodenum. A careful inspection was made as the gastroscope was withdrawn, including a retroflexed view of the proximal stomach; findings and interventions are described below.       Findings:   Esophagus:nodularity at GEJ -biopsy  Stomach: small hiatal hernia and questionable submucosal mass in the cardia right below the GEJ-not biopsied; antral gastritis manifested by thickened erythematous fold and associated small erosion-PyloriTek, biopsy from the antrum  Duodenum/jejunum: normal    Therapies:  See above    Specimens: see above           EBL: None    Complications:   None; patient tolerated the procedure well. Recommendations:  -Acid suppression with a proton pump inhibitor. , -Await pathology. , -Await MARLENE test result and treat for Helicobacter pylori if positive. , -No NSAIDS, -CT scan abdomen and pelvis    Megan Allen MD                 Colonoscopy Procedure Note    Indications:   Screening colonoscopy  Referring Physician: Navi Flores MD   Anesthesia/Sedation:MAC  Endoscopist:  Dr. 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 Risks of perforation, hemorrhage, adverse drug reaction, and aspiration were discussed. The patient was placed in the left lateral decubitus position. Based on the pre-procedure assessment, including review of the patient's medical history, medications, allergies, and review of systems, he had been deemed to be an appropriate candidate for moderate sedation; he was therefore sedated with the medications listed above. The patient was monitored continuously with ECG tracing, pulse oximetry, blood pressure monitoring, and direct observations. A rectal examination was performed. The [id number] was inserted into the rectum and advanced under direct vision to the terminal ileum. The quality of the colonic preparation was excellent. A careful inspection was made as the colonoscope was withdrawn, including a retroflexed view of the rectum; findings and interventions are described below.       Findings:   Rectum: normal  Sigmoid: 3 polyps all approximately 3 mm in size removed with cold snare  Descending Colon: normal  Transverse Colon: normal  Ascending Colon: 3 mm and 2 mm polyps at hepatic flexure removed with cold snare  Cecum:2 mm polyp of cecum removed with cold biopsy forceps  Terminal Ileum: normal    Specimens:     see above    EBL: None    Complications: None; patient tolerated the procedure well. Recommendations:     - Await pathology. - Repeat colonoscopy in 3 years.      - If < 10 years, reason: above average risk patient    Signed By: Karen Kern MD                        August 18, 2020

## 2020-08-27 ENCOUNTER — HOSPITAL ENCOUNTER (OUTPATIENT)
Dept: CT IMAGING | Age: 65
Discharge: HOME | End: 2020-08-27
Attending: SPECIALIST
Payer: MEDICARE

## 2020-08-27 DIAGNOSIS — R10.826 EPIGASTRIC REBOUND ABDOMINAL TENDERNESS: ICD-10-CM

## 2020-08-27 DIAGNOSIS — R10.13 EPIGASTRIC PAIN: ICD-10-CM

## 2020-08-27 PROCEDURE — 74011000258 HC RX REV CODE- 258: Performed by: RADIOLOGY

## 2020-08-27 PROCEDURE — 74177 CT ABD & PELVIS W/CONTRAST: CPT

## 2020-08-27 PROCEDURE — 74011000636 HC RX REV CODE- 636: Performed by: RADIOLOGY

## 2020-08-27 RX ORDER — SODIUM CHLORIDE 0.9 % (FLUSH) 0.9 %
10 SYRINGE (ML) INJECTION
Status: COMPLETED | OUTPATIENT
Start: 2020-08-27 | End: 2020-08-27

## 2020-08-27 RX ADMIN — IOPAMIDOL 100 ML: 755 INJECTION, SOLUTION INTRAVENOUS at 10:27

## 2020-08-27 RX ADMIN — SODIUM CHLORIDE 100 ML: 900 INJECTION, SOLUTION INTRAVENOUS at 10:27

## 2020-08-27 RX ADMIN — Medication 10 ML: at 10:27

## 2021-01-05 RX ORDER — ATORVASTATIN CALCIUM 20 MG/1
TABLET, FILM COATED ORAL
Qty: 30 TAB | Refills: 7 | Status: SHIPPED | OUTPATIENT
Start: 2021-01-05 | End: 2021-03-13

## 2021-04-19 RX ORDER — ATORVASTATIN CALCIUM 20 MG/1
TABLET, FILM COATED ORAL
Qty: 90 TAB | Refills: 0 | Status: SHIPPED | OUTPATIENT
Start: 2021-04-19 | End: 2021-07-21

## 2021-07-21 RX ORDER — ATORVASTATIN CALCIUM 20 MG/1
TABLET, FILM COATED ORAL
Qty: 90 TABLET | Refills: 0 | OUTPATIENT
Start: 2021-07-21

## 2021-07-21 RX ORDER — ATORVASTATIN CALCIUM 20 MG/1
TABLET, FILM COATED ORAL
Qty: 30 TABLET | Refills: 0 | Status: SHIPPED | OUTPATIENT
Start: 2021-07-21 | End: 2021-08-29 | Stop reason: SDUPTHER

## 2021-07-21 NOTE — TELEPHONE ENCOUNTER
Requested Prescriptions     Pending Prescriptions Disp Refills    atorvastatin (LIPITOR) 20 mg tablet 90 Tablet 0                 [id number], [address] AT [address] [facility] [address]  [phone]

## 2021-07-21 NOTE — TELEPHONE ENCOUNTER
Pt last seen 6/17/20. Due to return in one year. No future appt scheduled at this time. Will forward to front office pool to call pt to schedule appt.

## 2021-08-30 RX ORDER — ATORVASTATIN CALCIUM 20 MG/1
20 TABLET, FILM COATED ORAL DAILY
Qty: 30 TABLET | Refills: 0 | Status: SHIPPED | OUTPATIENT
Start: 2021-08-30 | End: 2021-08-31

## 2021-08-31 RX ORDER — ATORVASTATIN CALCIUM 20 MG/1
20 TABLET, FILM COATED ORAL DAILY
Qty: 90 TABLET | Refills: 0 | Status: SHIPPED | OUTPATIENT
Start: 2021-08-31 | End: 2021-09-20 | Stop reason: SDUPTHER

## 2021-09-20 ENCOUNTER — OFFICE VISIT (OUTPATIENT)
Dept: INTERNAL MEDICINE CLINIC | Age: 66
End: 2021-09-20
Payer: MEDICARE

## 2021-09-20 VITALS
RESPIRATION RATE: 16 BRPM | SYSTOLIC BLOOD PRESSURE: 117 MMHG | DIASTOLIC BLOOD PRESSURE: 76 MMHG | HEART RATE: 78 BPM | HEIGHT: 67 IN | WEIGHT: 230 LBS | TEMPERATURE: 97.8 F | OXYGEN SATURATION: 96 % | BODY MASS INDEX: 36.1 KG/M2

## 2021-09-20 DIAGNOSIS — Z12.11 SCREEN FOR COLON CANCER: ICD-10-CM

## 2021-09-20 DIAGNOSIS — Z12.5 PROSTATE CANCER SCREENING: ICD-10-CM

## 2021-09-20 DIAGNOSIS — B35.6 TINEA CRURIS: ICD-10-CM

## 2021-09-20 DIAGNOSIS — Z13.31 SCREENING FOR DEPRESSION: ICD-10-CM

## 2021-09-20 DIAGNOSIS — L20.84 INTRINSIC ECZEMA: ICD-10-CM

## 2021-09-20 DIAGNOSIS — E78.2 MIXED HYPERLIPIDEMIA: ICD-10-CM

## 2021-09-20 DIAGNOSIS — R20.9 SENSORY DISTURBANCE: ICD-10-CM

## 2021-09-20 DIAGNOSIS — Z00.00 MEDICARE ANNUAL WELLNESS VISIT, SUBSEQUENT: Primary | ICD-10-CM

## 2021-09-20 PROCEDURE — G8427 DOCREV CUR MEDS BY ELIG CLIN: HCPCS | Performed by: INTERNAL MEDICINE

## 2021-09-20 PROCEDURE — G8417 CALC BMI ABV UP PARAM F/U: HCPCS | Performed by: INTERNAL MEDICINE

## 2021-09-20 PROCEDURE — G0439 PPPS, SUBSEQ VISIT: HCPCS | Performed by: INTERNAL MEDICINE

## 2021-09-20 PROCEDURE — G8536 NO DOC ELDER MAL SCRN: HCPCS | Performed by: INTERNAL MEDICINE

## 2021-09-20 PROCEDURE — G9711 PT HX TOT COL OR COLON CA: HCPCS | Performed by: INTERNAL MEDICINE

## 2021-09-20 PROCEDURE — G8510 SCR DEP NEG, NO PLAN REQD: HCPCS | Performed by: INTERNAL MEDICINE

## 2021-09-20 PROCEDURE — 1101F PT FALLS ASSESS-DOCD LE1/YR: CPT | Performed by: INTERNAL MEDICINE

## 2021-09-20 RX ORDER — ATORVASTATIN CALCIUM 20 MG/1
20 TABLET, FILM COATED ORAL DAILY
Qty: 90 TABLET | Refills: 3 | Status: SHIPPED | OUTPATIENT
Start: 2021-09-20 | End: 2022-07-20 | Stop reason: SDUPTHER

## 2021-09-20 RX ORDER — CLOTRIMAZOLE AND BETAMETHASONE DIPROPIONATE 10; .64 MG/G; MG/G
CREAM TOPICAL 2 TIMES DAILY
Qty: 45 G | Refills: 1 | Status: SHIPPED | OUTPATIENT
Start: 2021-09-20 | End: 2022-07-20 | Stop reason: SDUPTHER

## 2021-09-20 RX ORDER — TRIAMCINOLONE ACETONIDE 1 MG/G
CREAM TOPICAL 2 TIMES DAILY
Qty: 45 G | Refills: 1 | Status: SHIPPED | OUTPATIENT
Start: 2021-09-20 | End: 2022-07-20

## 2021-09-20 RX ORDER — GABAPENTIN 100 MG/1
CAPSULE ORAL
Qty: 90 CAPSULE | Refills: 3 | Status: SHIPPED | OUTPATIENT
Start: 2021-09-20 | End: 2022-07-20 | Stop reason: SDUPTHER

## 2021-09-20 NOTE — PATIENT INSTRUCTIONS
Medicare Wellness Visit, Male    The best way to live healthy is to have a lifestyle where you eat a well-balanced diet, exercise regularly, limit alcohol use, and quit all forms of tobacco/nicotine, if applicable. Regular preventive services are another way to keep healthy. Preventive services (vaccines, screening tests, monitoring & exams) can help personalize your care plan, which helps you manage your own care. Screening tests can find health problems at the earliest stages, when they are easiest to treat. [facility] follows the current, evidence-based guidelines published by the [facility] ([facility]) when recommending preventive services for our patients. Because we follow these guidelines, sometimes recommendations change over time as research supports it. (For example, a prostate screening blood test is no longer routinely recommended for men with no symptoms). Of course, you and your doctor may decide to screen more often for some diseases, based on your risk and co-morbidities (chronic disease you are already diagnosed with). Preventive services for you include:  - Medicare offers their members a free annual wellness visit, which is time for you and your primary care provider to discuss and plan for your preventive service needs. Take advantage of this benefit every year!  -All adults over age 72 should receive the recommended pneumonia vaccines. Current USPSTF guidelines recommend a series of two vaccines for the best pneumonia protection.   -All adults should have a flu vaccine yearly and tetanus vaccine every 10 years.  -All adults age 48 and older should receive the shingles vaccines (series of two vaccines).        -All adults age 38-68 who are overweight should have a diabetes screening test once every three years.   -Other screening tests & preventive services for persons with diabetes include: an eye exam to screen for diabetic retinopathy, a kidney function test, a foot exam, and stricter control over your cholesterol.   -Cardiovascular screening for adults with routine risk involves an electrocardiogram (ECG) at intervals determined by the provider.   -Colorectal cancer screening should be done for adults age 54-65 with no increased risk factors for colorectal cancer. There are a number of acceptable methods of screening for this type of cancer. Each test has its own benefits and drawbacks. Discuss with your provider what is most appropriate for you during your annual wellness visit. The different tests include: colonoscopy (considered the best screening method), a fecal occult blood test, a fecal DNA test, and sigmoidoscopy.  -All adults born between [facility] should be screened once for Hepatitis C.  -An Abdominal Aortic Aneurysm (AAA) Screening is recommended for men age 73-68 who has ever smoked in their lifetime.      Here is a list of your current Health Maintenance items (your personalized list of preventive services) with a due date:  Health Maintenance Due   Topic Date Due    COVID-19 Vaccine (1) Never done    Cholesterol Test   06/23/2021    Yearly Flu Vaccine (1) 09/01/2021

## 2021-09-20 NOTE — PROGRESS NOTES
Verified name and birth date for privacy precautions. Chart reviewed in preparation for today's visit.      Chief Complaint   Patient presents with    Complete Physical          Health Maintenance Due   Topic    COVID-19 Vaccine (1)    Medicare Yearly Exam     Lipid Screen     Flu Vaccine (1)         Wt Readings from Last 3 Encounters:   09/20/21 230 lb (104.3 kg)   08/18/20 236 lb (107 kg)   07/06/20 248 lb (112.5 kg)     Temp Readings from Last 3 Encounters:   09/20/21 97.8 °F (36.6 °C) (Temporal)   08/18/20 97.8 °F (36.6 °C)   06/17/20 97.8 °F (36.6 °C) (Temporal)     BP Readings from Last 3 Encounters:   09/20/21 117/76   08/18/20 107/67   07/06/20 120/70     Pulse Readings from Last 3 Encounters:   09/20/21 78   08/18/20 73   07/06/20 82         Learning Assessment:  :     Learning Assessment 6/25/2020 12/5/2017 5/20/2014   PRIMARY LEARNER Patient Patient Patient   PRIMARY LANGUAGE ENGLISH ENGLISH ENGLISH   LEARNER PREFERENCE PRIMARY DEMONSTRATION OTHER (COMMENT) DEMONSTRATION   ANSWERED BY self patient patient   RELATIONSHIP SELF SELF SELF       Depression Screening:  :     3 most recent PHQ Screens 9/20/2021   Little interest or pleasure in doing things Not at all   Feeling down, depressed, irritable, or hopeless Not at all   Total Score PHQ 2 0   Trouble falling or staying asleep, or sleeping too much -   Feeling tired or having little energy -   Poor appetite, weight loss, or overeating -   Feeling bad about yourself - or that you are a failure or have let yourself or your family down -   Trouble concentrating on things such as school, work, reading, or watching TV -   Moving or speaking so slowly that other people could have noticed; or the opposite being so fidgety that others notice -   Thoughts of being better off dead, or hurting yourself in some way -   PHQ 9 Score -   How difficult have these problems made it for you to do your work, take care of your home and get along with others -       Fall Risk Assessment:  :     Fall Risk Assessment, last 12 mths 9/20/2021   Able to walk? Yes   Fall in past 12 months? 0   Do you feel unsteady? 0   Are you worried about falling 0   Number of falls in past 12 months -   Fall with injury? -       Abuse Screening:  :     Abuse Screening Questionnaire 9/20/2021 6/17/2020   Do you ever feel afraid of your partner? N N   Are you in a relationship with someone who physically or mentally threatens you? N N   Is it safe for you to go home?  Hiro Cuellar

## 2021-09-20 NOTE — PROGRESS NOTES
This is the Subsequent Medicare Annual Wellness Exam, performed 12 months or more after the Initial AWV or the last Subsequent AWV    I have reviewed the patient's medical history in detail and updated the computerized patient record. Assessment/Plan   Education and counseling provided:  Are appropriate based on today's review and evaluation    1. Medicare annual wellness visit, subsequent  2. Screening for depression  -     DEPRESSION SCREEN ANNUAL       Depression Risk Factor Screening     3 most recent PHQ Screens 9/20/2021   Little interest or pleasure in doing things Not at all   Feeling down, depressed, irritable, or hopeless Not at all   Total Score PHQ 2 0   Trouble falling or staying asleep, or sleeping too much -   Feeling tired or having little energy -   Poor appetite, weight loss, or overeating -   Feeling bad about yourself - or that you are a failure or have let yourself or your family down -   Trouble concentrating on things such as school, work, reading, or watching TV -   Moving or speaking so slowly that other people could have noticed; or the opposite being so fidgety that others notice -   Thoughts of being better off dead, or hurting yourself in some way -   PHQ 9 Score -   How difficult have these problems made it for you to do your work, take care of your home and get along with others -       Alcohol Risk Screen    Do you average more than 1 drink per night or more than 7 drinks a week: No- rare    In the past three months have you have had more than 4 drinks containing alcohol on one occasion: No        Functional Ability and Level of Safety    Hearing: Hearing is good. Activities of Daily Living: The home contains: no safety equipment. Patient does total self care      Ambulation: with no difficulty     Fall Risk:  Fall Risk Assessment, last 12 mths 9/20/2021   Able to walk? Yes   Fall in past 12 months? 0   Do you feel unsteady?  0   Are you worried about falling 0   Number of falls in past 12 months -   Fall with injury? -      Abuse Screen:  Patient is not abused       Cognitive Screening    Has your family/caregiver stated any concerns about your memory: no         Health Maintenance Due     Health Maintenance Due   Topic Date Due    COVID-19 Vaccine (1) Never done    Lipid Screen  06/23/2021    Flu Vaccine (1) 09/01/2021       Patient Care Team   Patient Care Team:  Nicolasa Almaguer MD as PCP - General  Nicolasa Almaguer MD as PCP - [address] [place] [address] [facility]    History     Patient Active Problem List   Diagnosis Code    Displacement of cervical intervertebral disc without myelopathy M50.20    Osteoarthrosis, unspecified whether generalized or localized, shoulder region M19.019    Pain in joint, site unspecified M25.50    Allergic rhinitis, cause unspecified J30.9    Mixed hyperlipidemia E78.2    Severe obesity (BMI 35.0-39. 9) with comorbidity ([facility] [address] E66.01     Past Medical History:   Diagnosis Date    Ill-defined condition     cellulitis, affects joints - treated intermittently    Ill-defined condition     pre cancerous cells on nose, treated    Ill-defined condition     diastasis rect      Past Surgical History:   Procedure Laterality Date    COLONOSCOPY N/A 8/18/2020    COLONOSCOPY, EGD performed by Kathia Barkley MD at [address] HX APPENDECTOMY  74    HX CATARACT REMOVAL      SC ABDOMEN SURGERY PROC UNLISTED       Current Outpatient Medications   Medication Sig Dispense Refill    atorvastatin (LIPITOR) 20 mg tablet TAKE 1 TABLET BY MOUTH DAILY 90 Tablet 0    aspirin delayed-release (ASPIR-81) 81 mg tablet Take 81 mg by mouth daily. Does not take daily.  multivitamin (ONE A DAY) tablet Take 1 Tab by mouth daily.        Allergies   Allergen Reactions    Latex, Natural Rubber Itching       Family History   Problem Relation Age of Onset    Psychiatric Disorder Mother         alzheimer's, other psychiatric problems    Parkinsonism Mother  Heart Disease Father     Stroke Father     Pulmonary Embolism Father     Lung Disease Father         black lung    Heart Disease Son     Diabetes Sister     Cancer Brother         prostate cancer    Other Sister         eye problem -detached retna      Social History     Tobacco Use    Smoking status: Never Smoker    Smokeless tobacco: Never Used   Substance Use Topics    Alcohol use:  Yes     Alcohol/week: 0.0 standard drinks     Comment: 1 per month         Narinder Jeffery MD

## 2021-09-20 NOTE — PROGRESS NOTES
HISTORY OF PRESENT ILLNESS  Bryon Jordan is a 74 y.o. male. HPI  Assessment: Cony Desouza is seen today for a Medicare Wellness Visit and follow up of chronic problems. Preventive Care. See attached Wellness Visit note. He is due for labs. He is up to date with colon cancer screening with [facility] in 2019. He has had all of his vaccines including the COVID vaccine. Chronic Problems Reviewed. He is due for a cholesterol check. New Problem Reviewed. Pulmonary nodule noted 2020. It was 2 millimeters. This does not require further follow up. Review of Systems   Constitutional: Negative for chills, fever and weight loss. Respiratory: Negative. Cardiovascular: Negative for chest pain, palpitations, leg swelling and PND. Gastrointestinal: Negative. Genitourinary: Positive for frequency and urgency. Negative for hematuria. Musculoskeletal: Negative for myalgias. Skin: Positive for rash. Refractory jock itch  Lower extremity eczema rash   Neurological: Positive for tingling and sensory change. Negative for focal weakness. Chronic formicosis sx , had negative neuro work up  No treatment discussed  He agrees to trial of gabapentin        Physical Exam  Vitals and nursing note reviewed. Constitutional:       General: He is not in acute distress. Appearance: He is well-developed. HENT:      Head: Normocephalic and atraumatic. Right Ear: Tympanic membrane, ear canal and external ear normal.      Left Ear: Tympanic membrane, ear canal and external ear normal.   Eyes:      General:         Right eye: No discharge. Left eye: No discharge. Pupils: Pupils are equal, round, and reactive to light. Neck:      Thyroid: No thyromegaly. Vascular: No carotid bruit. Cardiovascular:      Rate and Rhythm: Normal rate and regular rhythm. Heart sounds: Normal heart sounds. No murmur heard. No friction rub. No gallop.     Pulmonary:      Effort: Pulmonary effort is normal. No respiratory distress. Breath sounds: Normal breath sounds. No wheezing or rales. Abdominal:      General: Bowel sounds are normal. There is no distension. Palpations: Abdomen is soft. There is no mass. Tenderness: There is no abdominal tenderness. There is no guarding or rebound. Genitourinary:      Musculoskeletal:         General: No tenderness. Normal range of motion. Cervical back: Normal range of motion and neck supple. Lymphadenopathy:      Cervical: No cervical adenopathy. Skin:     General: Skin is warm and dry. Findings: No rash. Neurological:      Mental Status: He is alert and oriented to person, place, and time. Deep Tendon Reflexes: Reflexes are normal and symmetric. Psychiatric:         Behavior: Behavior normal.         ASSESSMENT and PLAN  Diagnoses and all orders for this visit:    1. Medicare annual wellness visit, subsequent    2. Screening for depression  -     DEPRESSION SCREEN ANNUAL    3. Mixed hyperlipidemia  -     METABOLIC PANEL, COMPREHENSIVE; Future  -     CBC WITH AUTOMATED DIFF; Future  -     LIPID PANEL; Future  -     TSH 3RD GENERATION; Future  -     URINALYSIS W/ RFLX MICROSCOPIC; Future  -     atorvastatin (LIPITOR) 20 mg tablet; Take 1 Tablet by mouth daily. 4. Screen for colon cancer    5. Prostate cancer screening  -     PSA SCREENING (SCREENING); Future    6. Sensory disturbance  -     gabapentin (NEURONTIN) 100 mg capsule; 1 nightly x 7 days, then 2 nightly x 7 days, then 3 PO nightly    7. Intrinsic eczema  -     triamcinolone acetonide (KENALOG) 0.1 % topical cream; Apply  to affected area two (2) times a day. use thin layer- to leg rash    8. Tinea cruris  -     clotrimazole-betamethasone (LOTRISONE) topical cream; Apply  to affected area two (2) times a day.  Prn to scrotal rash

## 2021-09-23 LAB
ALBUMIN SERPL-MCNC: 4.6 G/DL (ref 3.8–4.8)
ALBUMIN/GLOB SERPL: 2.1 {RATIO} (ref 1.2–2.2)
ALP SERPL-CCNC: 62 IU/L (ref 44–121)
ALT SERPL-CCNC: 18 IU/L (ref 0–44)
APPEARANCE UR: CLEAR
AST SERPL-CCNC: 30 IU/L (ref 0–40)
BASOPHILS # BLD AUTO: 0.1 X10E3/UL (ref 0–0.2)
BASOPHILS NFR BLD AUTO: 1 %
BILIRUB SERPL-MCNC: 0.9 MG/DL (ref 0–1.2)
BILIRUB UR QL STRIP: NEGATIVE
BUN SERPL-MCNC: 10 MG/DL (ref 8–27)
BUN/CREAT SERPL: 10 (ref 10–24)
CALCIUM SERPL-MCNC: 9.3 MG/DL (ref 8.6–10.2)
CHLORIDE SERPL-SCNC: 104 MMOL/L (ref 96–106)
CHOLEST SERPL-MCNC: 137 MG/DL (ref 100–199)
CO2 SERPL-SCNC: 21 MMOL/L (ref 20–29)
COLOR UR: YELLOW
CREAT SERPL-MCNC: 1.04 MG/DL (ref 0.76–1.27)
EOSINOPHIL # BLD AUTO: 0.4 X10E3/UL (ref 0–0.4)
EOSINOPHIL NFR BLD AUTO: 4 %
ERYTHROCYTE [DISTWIDTH] IN BLOOD BY AUTOMATED COUNT: 12.7 % (ref 11.6–15.4)
GLOBULIN SER CALC-MCNC: 2.2 G/DL (ref 1.5–4.5)
GLUCOSE SERPL-MCNC: 90 MG/DL (ref 65–99)
GLUCOSE UR QL: NEGATIVE
HCT VFR BLD AUTO: 43.2 % (ref 37.5–51)
HDLC SERPL-MCNC: 47 MG/DL
HGB BLD-MCNC: 14.5 G/DL (ref 13–17.7)
HGB UR QL STRIP: NEGATIVE
IMM GRANULOCYTES # BLD AUTO: 0 X10E3/UL (ref 0–0.1)
IMM GRANULOCYTES NFR BLD AUTO: 0 %
KETONES UR QL STRIP: NEGATIVE
LDLC SERPL CALC-MCNC: 72 MG/DL (ref 0–99)
LEUKOCYTE ESTERASE UR QL STRIP: NEGATIVE
LYMPHOCYTES # BLD AUTO: 2.7 X10E3/UL (ref 0.7–3.1)
LYMPHOCYTES NFR BLD AUTO: 33 %
MCH RBC QN AUTO: 30 PG (ref 26.6–33)
MCHC RBC AUTO-ENTMCNC: 33.6 G/DL (ref 31.5–35.7)
MCV RBC AUTO: 89 FL (ref 79–97)
MICRO URNS: NORMAL
MONOCYTES # BLD AUTO: 0.7 X10E3/UL (ref 0.1–0.9)
MONOCYTES NFR BLD AUTO: 8 %
NEUTROPHILS # BLD AUTO: 4.3 X10E3/UL (ref 1.4–7)
NEUTROPHILS NFR BLD AUTO: 54 %
NITRITE UR QL STRIP: NEGATIVE
PH UR STRIP: 5 [PH] (ref 5–7.5)
PLATELET # BLD AUTO: 192 X10E3/UL (ref 150–450)
POTASSIUM SERPL-SCNC: 4.1 MMOL/L (ref 3.5–5.2)
PROT SERPL-MCNC: 6.8 G/DL (ref 6–8.5)
PROT UR QL STRIP: NEGATIVE
PSA SERPL-MCNC: 0.9 NG/ML (ref 0–4)
RBC # BLD AUTO: 4.83 X10E6/UL (ref 4.14–5.8)
SODIUM SERPL-SCNC: 139 MMOL/L (ref 134–144)
SP GR UR: 1.03 (ref 1–1.03)
TRIGL SERPL-MCNC: 96 MG/DL (ref 0–149)
TSH SERPL DL<=0.005 MIU/L-ACNC: 1.74 UIU/ML (ref 0.45–4.5)
UROBILINOGEN UR STRIP-MCNC: 0.2 MG/DL (ref 0.2–1)
VLDLC SERPL CALC-MCNC: 18 MG/DL (ref 5–40)
WBC # BLD AUTO: 8.1 X10E3/UL (ref 3.4–10.8)

## 2022-03-19 PROBLEM — E66.01 SEVERE OBESITY (BMI 35.0-39.9) WITH COMORBIDITY (HCC): Status: ACTIVE | Noted: 2018-03-29

## 2022-07-01 ENCOUNTER — TELEPHONE (OUTPATIENT)
Dept: INTERNAL MEDICINE CLINIC | Age: 67
End: 2022-07-01

## 2022-07-01 NOTE — TELEPHONE ENCOUNTER
Reason for call: Pt came in today at check in--pt whole family is with Dr. Tyra Orozco and wondering if he would take him on as a patient because he does not want to leave the practice and would prefer a male doctor. I informed he is not taking new pt's at this time, but would send a message. He stated Dr. Tyra Orozco took on one of his family members because he knew the family before.     Is this a new problem: yes     Date of last appointment:  9/20/2021     Can we respond via [employer]: no    Best call back number: [phone]

## 2022-07-20 ENCOUNTER — OFFICE VISIT (OUTPATIENT)
Dept: INTERNAL MEDICINE CLINIC | Age: 67
End: 2022-07-20
Payer: COMMERCIAL

## 2022-07-20 VITALS
SYSTOLIC BLOOD PRESSURE: 125 MMHG | BODY MASS INDEX: 34.78 KG/M2 | HEART RATE: 78 BPM | OXYGEN SATURATION: 97 % | RESPIRATION RATE: 20 BRPM | HEIGHT: 67 IN | TEMPERATURE: 99.1 F | DIASTOLIC BLOOD PRESSURE: 82 MMHG | WEIGHT: 221.6 LBS

## 2022-07-20 DIAGNOSIS — B35.6 TINEA CRURIS: ICD-10-CM

## 2022-07-20 DIAGNOSIS — Z00.00 MEDICARE ANNUAL WELLNESS VISIT, SUBSEQUENT: Primary | ICD-10-CM

## 2022-07-20 DIAGNOSIS — R20.9 SENSORY DISTURBANCE: ICD-10-CM

## 2022-07-20 DIAGNOSIS — E78.2 MIXED HYPERLIPIDEMIA: ICD-10-CM

## 2022-07-20 DIAGNOSIS — Z00.00 ROUTINE GENERAL MEDICAL EXAMINATION AT A HEALTH CARE FACILITY: ICD-10-CM

## 2022-07-20 DIAGNOSIS — Z12.5 SCREENING FOR PROSTATE CANCER: ICD-10-CM

## 2022-07-20 PROCEDURE — 99214 OFFICE O/P EST MOD 30 MIN: CPT | Performed by: INTERNAL MEDICINE

## 2022-07-20 RX ORDER — ATORVASTATIN CALCIUM 20 MG/1
20 TABLET, FILM COATED ORAL DAILY
Qty: 90 TABLET | Refills: 3 | Status: SHIPPED | OUTPATIENT
Start: 2022-07-20 | End: 2022-10-12 | Stop reason: SDUPTHER

## 2022-07-20 RX ORDER — CLOTRIMAZOLE AND BETAMETHASONE DIPROPIONATE 10; .64 MG/G; MG/G
CREAM TOPICAL 2 TIMES DAILY
Qty: 45 G | Refills: 1 | Status: SHIPPED | OUTPATIENT
Start: 2022-07-20

## 2022-07-20 RX ORDER — GABAPENTIN 100 MG/1
CAPSULE ORAL
Qty: 90 CAPSULE | Refills: 3 | Status: SHIPPED | OUTPATIENT
Start: 2022-07-20

## 2022-07-20 NOTE — PROGRESS NOTES
This is the Subsequent Medicare Annual Wellness Exam, performed 12 months or more after the Initial AWV or the last Subsequent AWV    I have reviewed the patient's medical history in detail and updated the computerized patient record. Assessment/Plan   Education and counseling provided:  {Education List:89032}{}    1. Routine general medical examination at a health care facility  2. Mixed hyperlipidemia  The following orders have not been finalized:  -     atorvastatin (LIPITOR) 20 mg tablet  3. Tinea cruris  The following orders have not been finalized:  -     clotrimazole-betamethasone (LOTRISONE) topical cream  4. Sensory disturbance  The following orders have not been finalized:  -     gabapentin (NEURONTIN) 100 mg capsule  5. Screening for prostate cancer  6.  Medicare annual wellness visit, subsequent       Depression Risk Factor Screening     3 most recent PHQ Screens 7/20/2022   Little interest or pleasure in doing things Not at all   Feeling down, depressed, irritable, or hopeless Not at all   Total Score PHQ 2 0   Trouble falling or staying asleep, or sleeping too much -   Feeling tired or having little energy -   Poor appetite, weight loss, or overeating -   Feeling bad about yourself - or that you are a failure or have let yourself or your family down -   Trouble concentrating on things such as school, work, reading, or watching TV -   Moving or speaking so slowly that other people could have noticed; or the opposite being so fidgety that others notice -   Thoughts of being better off dead, or hurting yourself in some way -   PHQ 9 Score -   How difficult have these problems made it for you to do your work, take care of your home and get along with others -       Alcohol & Drug Abuse Risk Screen    Do you average more than 1 drink per night or more than 7 drinks a week: {Yes/No:912968}    In the past three months have you have had more than 4 drinks containing alcohol on one occasion: {Yes/No:383789}          Functional Ability and Level of Safety    Hearing: {Desc; hearing loss:34941::\"Hearing is good. \"}{}      Activities of Daily Living: The home contains: {AWV Home CMSZKI:71304::\"NB safety equipment. \"}{}  {Functional ADL's:43026::\"Patient does total self care\"}{}      Ambulation: {Patient ambulates:61692::\"with no difficulty\"}{}     Fall Risk:  Fall Risk Assessment, last 12 mths 2022   Able to walk? Yes   Fall in past 12 months? 1   Do you feel unsteady? 0   Are you worried about falling 0   Is TUG test greater than 12 seconds? 0   Is the gait abnormal? 0   Number of falls in past 12 months 1   Fall with injury? 0      Abuse Screen:  {Abuse Screen:::\"Patient is not abused\"}{}       Cognitive Screening    Has your family/caregiver stated any concerns about your memory: {AWV no/yes:81122::\"no\"}{}     {Cognitive Screenin}{}    Health Maintenance Due     Health Maintenance Due   Topic Date Due    COVID-19 Vaccine (3 - Booster for Moderna series) 2021       Patient Care Team   Patient Care Team:  Jerome Snow MD as PCP - General  Jerome Snow MD as PCP - [facility] Empaneled Provider    History     Patient Active Problem List   Diagnosis Code    Displacement of cervical intervertebral disc without myelopathy M50.20    Osteoarthrosis, unspecified whether generalized or localized, shoulder region M19.019    Pain in joint, site unspecified M25.50    Allergic rhinitis, cause unspecified J30.9    Mixed hyperlipidemia E78.2    Severe obesity (BMI 35.0-39. 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 90 Tablet 3    clotrimazole-betamethasone (LOTRISONE) topical cream Apply  to affected area two (2) times a day. Prn to scrotal rash 45 g 1    gabapentin (NEURONTIN) 100 mg capsule 1 nightly x 7 days, then 2 nightly x 7 days, then 3 PO nightly (Patient not taking: Reported on 7/20/2022) 90 Capsule 3     Allergies   Allergen Reactions    Latex, Natural Rubber Itching       Family History   Problem Relation Age of Onset    Psychiatric Disorder Mother         alzheimer's, other psychiatric problems    Parkinsonism Mother     Heart Disease Father     Stroke Father     Pulmonary Embolism Father     Lung Disease Father         black lung    Heart Disease Son     Diabetes Sister     Cancer Brother         prostate cancer    Other Sister         eye problem -detached retna      Social History     Tobacco Use    Smoking status: Never    Smokeless tobacco: Never   Substance Use Topics    Alcohol use:  Yes     Alcohol/week: 0.0 standard drinks     Comment: 1 per month         Selma Willams MD

## 2022-07-20 NOTE — PATIENT INSTRUCTIONS
Medicare Wellness Visit, Male    The best way to live healthy is to have a lifestyle where you eat a well-balanced diet, exercise regularly, limit alcohol use, and quit all forms of tobacco/nicotine, if applicable. Regular preventive services are another way to keep healthy. Preventive services (vaccines, screening tests, monitoring & exams) can help personalize your care plan, which helps you manage your own care. Screening tests can find health problems at the earliest stages, when they are easiest to treat. [facility] follows the current, evidence-based guidelines published by the [facility] Aline ([facility]) when recommending preventive services for our patients. Because we follow these guidelines, sometimes recommendations change over time as research supports it. (For example, a prostate screening blood test is no longer routinely recommended for men with no symptoms). Of course, you and your doctor may decide to screen more often for some diseases, based on your risk and co-morbidities (chronic disease you are already diagnosed with). Preventive services for you include:  - Medicare offers their members a free annual wellness visit, which is time for you and your primary care provider to discuss and plan for your preventive service needs. Take advantage of this benefit every year!  -All adults over age 72 should receive the recommended pneumonia vaccines. Current USPSTF guidelines recommend a series of two vaccines for the best pneumonia protection.   -All adults should have a flu vaccine yearly and tetanus vaccine every 10 years.  -All adults age 48 and older should receive the shingles vaccines (series of two vaccines).        -All adults age 38-68 who are overweight should have a diabetes screening test once every three years.   -Other screening tests & preventive services for persons with diabetes include: an eye exam to screen for diabetic retinopathy, a kidney function test, a foot exam, and stricter control over your cholesterol.   -Cardiovascular screening for adults with routine risk involves an electrocardiogram (ECG) at intervals determined by the provider.   -Colorectal cancer screening should be done for adults age 54-65 with no increased risk factors for colorectal cancer. There are a number of acceptable methods of screening for this type of cancer. Each test has its own benefits and drawbacks. Discuss with your provider what is most appropriate for you during your annual wellness visit. The different tests include: colonoscopy (considered the best screening method), a fecal occult blood test, a fecal DNA test, and sigmoidoscopy.  -All adults born between [facility] should be screened once for Hepatitis C.  -An Abdominal Aortic Aneurysm (AAA) Screening is recommended for men age 73-68 who has ever smoked in their lifetime.      Here is a list of your current Health Maintenance items (your personalized list of preventive services) with a due date:  Health Maintenance Due   Topic Date Due    COVID-19 Vaccine (3 - Booster for Luis Enrique Roch series) 08/28/2021

## 2022-07-20 NOTE — PROGRESS NOTES
HISTORY OF PRESENT ILLNESS  Audelia Almaraz is a 74 y.o. male. Complete Physical  Pertinent negatives include no chest pain. ASSESSMENT     Sabrina Vasquez presents today for a complete exam and review of chronic problems    Preventive-  DUE- routine labs  UP TO DATE= vaccines, colonoscopy    2. Chronic-  - pruritus is chronic, did improve with gabapentin and he'd like to continue. Denies any side effects when he took this previously   - cholesterol is due    Review of Systems   Constitutional:  Negative for chills, fever and weight loss. Respiratory: Negative. Cardiovascular:  Negative for chest pain, palpitations, leg swelling and PND. Musculoskeletal:  Negative for myalgias. Neurological:  Positive for tingling and sensory change. Negative for focal weakness. Physical Exam  Vitals and nursing note reviewed. Constitutional:       General: He is not in acute distress. Neck:      Vascular: No carotid bruit. Cardiovascular:      Rate and Rhythm: Normal rate and regular rhythm. Heart sounds: No murmur heard. No friction rub. No gallop. Pulmonary:      Effort: Pulmonary effort is normal. No respiratory distress. Breath sounds: Normal breath sounds. Musculoskeletal:      Right lower leg: No edema. Left lower leg: No edema. ASSESSMENT and PLAN  Diagnoses and all orders for this visit:    1. Medicare annual wellness visit, subsequent    2. Mixed hyperlipidemia  -     atorvastatin (LIPITOR) 20 mg tablet; Take 1 Tablet by mouth in the morning. 3. Tinea cruris  -     clotrimazole-betamethasone (LOTRISONE) topical cream; Apply  to affected area two (2) times a day. Prn to scrotal rash    4. Sensory disturbance  -     gabapentin (NEURONTIN) 100 mg capsule; 1 nightly x 7 days, then 2 nightly x 7 days, then 3 PO nightly    5. Routine general medical examination at a health care facility  -     CBC WITH AUTOMATED DIFF; Future  -     METABOLIC PANEL, COMPREHENSIVE;  Future  -     LIPID PANEL; Future  -     TSH 3RD GENERATION; Future  -     URINALYSIS W/ REFLEX CULTURE; Future    6. Screening for prostate cancer  -     PSA SCREENING (SCREENING);  Future    Other orders  -     MICROSCOPIC EXAMINATION      use of aspirin to prevent MI and TIA's discussed

## 2022-07-24 LAB
ALBUMIN SERPL-MCNC: 4.2 G/DL (ref 3.8–4.8)
ALBUMIN/GLOB SERPL: 1.6 {RATIO} (ref 1.2–2.2)
ALP SERPL-CCNC: 55 IU/L (ref 44–121)
ALT SERPL-CCNC: 10 IU/L (ref 0–44)
APPEARANCE UR: CLEAR
AST SERPL-CCNC: 18 IU/L (ref 0–40)
BACTERIA #/AREA URNS HPF: NORMAL /[HPF]
BASOPHILS # BLD AUTO: 0 X10E3/UL (ref 0–0.2)
BASOPHILS NFR BLD AUTO: 1 %
BILIRUB SERPL-MCNC: 0.8 MG/DL (ref 0–1.2)
BILIRUB UR QL STRIP: NEGATIVE
BUN SERPL-MCNC: 16 MG/DL (ref 8–27)
BUN/CREAT SERPL: 14 (ref 10–24)
CALCIUM SERPL-MCNC: 9.8 MG/DL (ref 8.6–10.2)
CASTS URNS QL MICRO: NORMAL /LPF
CHLORIDE SERPL-SCNC: 103 MMOL/L (ref 96–106)
CHOLEST SERPL-MCNC: 138 MG/DL (ref 100–199)
CO2 SERPL-SCNC: 22 MMOL/L (ref 20–29)
COLOR UR: YELLOW
CREAT SERPL-MCNC: 1.14 MG/DL (ref 0.76–1.27)
EGFR: 70 ML/MIN/1.73
EOSINOPHIL # BLD AUTO: 0.2 X10E3/UL (ref 0–0.4)
EOSINOPHIL NFR BLD AUTO: 3 %
EPI CELLS #/AREA URNS HPF: NORMAL /HPF (ref 0–10)
ERYTHROCYTE [DISTWIDTH] IN BLOOD BY AUTOMATED COUNT: 12.7 % (ref 11.6–15.4)
GLOBULIN SER CALC-MCNC: 2.7 G/DL (ref 1.5–4.5)
GLUCOSE SERPL-MCNC: 99 MG/DL (ref 65–99)
GLUCOSE UR QL STRIP: NEGATIVE
HCT VFR BLD AUTO: 43.6 % (ref 37.5–51)
HDLC SERPL-MCNC: 53 MG/DL
HGB BLD-MCNC: 14.4 G/DL (ref 13–17.7)
HGB UR QL STRIP: ABNORMAL
IMM GRANULOCYTES # BLD AUTO: 0 X10E3/UL (ref 0–0.1)
IMM GRANULOCYTES NFR BLD AUTO: 0 %
KETONES UR QL STRIP: NEGATIVE
LDLC SERPL CALC-MCNC: 73 MG/DL (ref 0–99)
LEUKOCYTE ESTERASE UR QL STRIP: NEGATIVE
LYMPHOCYTES # BLD AUTO: 1.8 X10E3/UL (ref 0.7–3.1)
LYMPHOCYTES NFR BLD AUTO: 29 %
MCH RBC QN AUTO: 29.8 PG (ref 26.6–33)
MCHC RBC AUTO-ENTMCNC: 33 G/DL (ref 31.5–35.7)
MCV RBC AUTO: 90 FL (ref 79–97)
MICRO URNS: ABNORMAL
MONOCYTES # BLD AUTO: 0.5 X10E3/UL (ref 0.1–0.9)
MONOCYTES NFR BLD AUTO: 8 %
NEUTROPHILS # BLD AUTO: 3.7 X10E3/UL (ref 1.4–7)
NEUTROPHILS NFR BLD AUTO: 59 %
NITRITE UR QL STRIP: NEGATIVE
PH UR STRIP: 6.5 [PH] (ref 5–7.5)
PLATELET # BLD AUTO: 161 X10E3/UL (ref 150–450)
POTASSIUM SERPL-SCNC: 4.6 MMOL/L (ref 3.5–5.2)
PROT SERPL-MCNC: 6.9 G/DL (ref 6–8.5)
PROT UR QL STRIP: NEGATIVE
PSA SERPL-MCNC: 0.6 NG/ML (ref 0–4)
RBC # BLD AUTO: 4.84 X10E6/UL (ref 4.14–5.8)
RBC #/AREA URNS HPF: NORMAL /HPF (ref 0–2)
SODIUM SERPL-SCNC: 139 MMOL/L (ref 134–144)
SP GR UR STRIP: 1.02 (ref 1–1.03)
TRIGL SERPL-MCNC: 59 MG/DL (ref 0–149)
TSH SERPL DL<=0.005 MIU/L-ACNC: 1.73 UIU/ML (ref 0.45–4.5)
URINALYSIS REFLEX, 377202: ABNORMAL
UROBILINOGEN UR STRIP-MCNC: 0.2 MG/DL (ref 0.2–1)
VLDLC SERPL CALC-MCNC: 12 MG/DL (ref 5–40)
WBC # BLD AUTO: 6.2 X10E3/UL (ref 3.4–10.8)
WBC #/AREA URNS HPF: NORMAL /HPF (ref 0–5)

## 2022-10-12 DIAGNOSIS — E78.2 MIXED HYPERLIPIDEMIA: ICD-10-CM

## 2022-10-12 NOTE — TELEPHONE ENCOUNTER
Requested Prescriptions     Pending Prescriptions Disp Refills    atorvastatin (LIPITOR) 20 mg tablet 90 Tablet 3     Sig: Take 1 Tablet by mouth daily.      [id number] - [address] [facility] AT [address]  [phone]    [facility] [phone]    Patient has appt with Dr. Junior Gregorio on 11/14/22

## 2022-10-12 NOTE — TELEPHONE ENCOUNTER
Pt last seen on 7/20/22. Due to return in 6 months. Pt has appt with Dr Sarah Caban on 11/14/22.   Will forward to MD on call Dr Luis Daniel Smith.

## 2022-10-13 RX ORDER — ATORVASTATIN CALCIUM 20 MG/1
20 TABLET, FILM COATED ORAL DAILY
Qty: 90 TABLET | Refills: 0 | Status: SHIPPED | OUTPATIENT
Start: 2022-10-13

## 2022-11-14 ENCOUNTER — OFFICE VISIT (OUTPATIENT)
Dept: INTERNAL MEDICINE CLINIC | Age: 67
End: 2022-11-14
Payer: COMMERCIAL

## 2022-11-14 VITALS
HEART RATE: 70 BPM | RESPIRATION RATE: 16 BRPM | WEIGHT: 215.8 LBS | TEMPERATURE: 98.4 F | OXYGEN SATURATION: 97 % | HEIGHT: 67 IN | DIASTOLIC BLOOD PRESSURE: 85 MMHG | BODY MASS INDEX: 33.87 KG/M2 | SYSTOLIC BLOOD PRESSURE: 135 MMHG

## 2022-11-14 DIAGNOSIS — M25.50 POLYARTHRALGIA: ICD-10-CM

## 2022-11-14 DIAGNOSIS — Z76.89 ESTABLISHING CARE WITH NEW DOCTOR, ENCOUNTER FOR: Primary | ICD-10-CM

## 2022-11-14 DIAGNOSIS — E78.2 MIXED HYPERLIPIDEMIA: ICD-10-CM

## 2022-11-14 DIAGNOSIS — M19.049 HAND ARTHRITIS: ICD-10-CM

## 2022-11-14 DIAGNOSIS — R20.2 PARESTHESIA: ICD-10-CM

## 2022-11-14 DIAGNOSIS — E66.9 OBESITY (BMI 30.0-34.9): ICD-10-CM

## 2022-11-14 PROBLEM — E66.811 OBESITY (BMI 30.0-34.9): Status: ACTIVE | Noted: 2018-03-29

## 2022-11-14 PROCEDURE — 99214 OFFICE O/P EST MOD 30 MIN: CPT | Performed by: STUDENT IN AN ORGANIZED HEALTH CARE EDUCATION/TRAINING PROGRAM

## 2022-11-14 RX ORDER — DICLOFENAC SODIUM 10 MG/G
2 GEL TOPICAL
Qty: 50 G | Refills: 3 | Status: SHIPPED | OUTPATIENT
Start: 2022-11-14

## 2022-11-14 NOTE — PATIENT INSTRUCTIONS
CeraVe, Lubriderm, Eucerin are all good lotion brands   Use unscented detergents, lotions, etc.     Voltaren 1% topical    Please obtain the following vaccines from your local pharmacy. Please ask your pharmacy to fax our office a copy of the vaccination record.  Our fax number is [phone].   - flu vaccine  - bivalent COVID booster - make sure it it's 3 months from your COVID infection  - pneumonia vaccine Prevnar 20

## 2022-11-14 NOTE — PROGRESS NOTES
Clarita Snellen is a 69y.o. year old male who is a new patient to me today (11/14/22). He was previous followed by Dr Corina Booker. Assessment & Plan:   1. Establishing care with new doctor, encounter for  2. Hand arthritis  -     diclofenac (VOLTAREN) 1 % gel; Apply 2 g to affected area four (4) times daily as needed for Pain (hand pain). , Normal, Disp-50 g, R-3  3. Polyarthralgia  Assessment & Plan:  Bony hypertrophy of multiple joints of fingers suggests OA. Mostly PIPs BL. Trial of topical diclofenac. He declines xray to further eval.   Discussed referral to ortho for knee OA (injections, joint replacement), he declines  4. Mixed hyperlipidemia  Assessment & Plan:  Controlled, cont statin   5. Paresthesia  Assessment & Plan:  Stop gabapentin, it is not useful and he doesn't like side effect weight gain. 6. Obesity (BMI 30.0-34. 9)  Assessment & Plan:  He continues to loose weight          Health Maintenance   Flu vaccine: discussed   COVID vaccine: discussed bivalent booster   Tetanus vaccine: 5/2014  Shingles vaccine: up to date  Pneumonia vaccine: discussed prevnar 20  Colon cancer screening:  PSA: 0.6 7/2022  AAA screening:  Lung cancer screening:  Hep C:  Lipid: 7/2022  DM: 5.7% 2020, fasting BG normal   Healthcare decision maker:  ACP:      RTC: 7/2023 annual wellness    Subjective:   Dory Nuñez was seen today for Establish Care    HLD - atorvastatin    Gabapentin started for paresthesias that were generalized over his extremities, face, etc. He is not sure if gabapentin was helping. He doesn't like side effects of medication. He has tried lotion intermittently but doesn't think this is a factor. Low back pain, hand pain, BL knee pain - these are chronic issues, achey. Has hx low back disc disease. He is noticing some finger swelling and has trouble picking things up, making a fist. He thinks he has BL knee arthritis and has trouble bending knees. Obesity - reports he love to eat.  He has been loosing weight. Review of Systems   All other systems reviewed and are negative. PMHx    Patient Active Problem List   Diagnosis Code    Displacement of cervical intervertebral disc without myelopathy M50.20    Osteoarthrosis, unspecified whether generalized or localized, shoulder region M19.019    Polyarthralgia M25.50    Allergic rhinitis, cause unspecified J30.9    Mixed hyperlipidemia E78.2    Obesity (BMI 30.0-34. 9) E66.9    Paresthesia R20.2       Prior to Admission medications    Medication Sig Start Date End Date Taking? Authorizing Provider   diclofenac (VOLTAREN) 1 % gel Apply 2 g to affected area four (4) times daily as needed for Pain (hand pain). 11/14/22  Yes Ken Chirinos MD   atorvastatin (LIPITOR) 20 mg tablet Take 1 Tablet by mouth daily. 10/13/22  Yes Lucio Olivo MD   clotrimazole-betamethasone (LOTRISONE) topical cream Apply  to affected area two (2) times a day. Prn to scrotal rash 7/20/22  Yes Elvie Glass MD   gabapentin (NEURONTIN) 100 mg capsule 1 nightly x 7 days, then 2 nightly x 7 days, then 3 PO nightly  Patient not taking: Reported on 11/14/2022 7/20/22 11/14/22  Robin Harvey MD       The following sections were reviewed & updated as appropriate: Problem List, Allergies, PMH, PSH, FH, and SH. Objective:   Visit Vitals  /85 (BP 1 Location: Right upper arm)   Pulse 70   Temp 98.4 °F (36.9 °C)   Resp 16   Ht 5' 7\" (1.702 m)   Wt 215 lb 12.8 oz (97.9 kg)   SpO2 97%   BMI 33.80 kg/m²       Physical Exam  Constitutional:       General: He is not in acute distress. Eyes:      Extraocular Movements: Extraocular movements intact. Conjunctiva/sclera: Conjunctivae normal.   Cardiovascular:      Rate and Rhythm: Normal rate and regular rhythm. Heart sounds: No murmur heard. Pulmonary:      Effort: Pulmonary effort is normal. No respiratory distress. Abdominal:      General: Bowel sounds are normal.      Palpations: Abdomen is soft. Musculoskeletal:      Right lower leg: No edema. Left lower leg: No edema. Comments: Enlargement of multiple finger joints, not boggy, warm, or tender. Neurological:      General: No focal deficit present. Mental Status: He is alert.    Psychiatric:         Mood and Affect: Mood normal.         Behavior: Behavior normal.            Darian Mckeon MD

## 2022-11-15 NOTE — ASSESSMENT & PLAN NOTE
Bony hypertrophy of multiple joints of fingers suggests OA. Mostly PIPs BL. Trial of topical diclofenac.  He declines xray to further eval.   Discussed referral to ortho for knee OA (injections, joint replacement), he declines

## 2023-09-12 ENCOUNTER — OFFICE VISIT (OUTPATIENT)
Age: 68
End: 2023-09-12
Payer: MEDICARE

## 2023-09-12 VITALS
BODY MASS INDEX: 34.65 KG/M2 | RESPIRATION RATE: 20 BRPM | WEIGHT: 220.8 LBS | DIASTOLIC BLOOD PRESSURE: 78 MMHG | OXYGEN SATURATION: 97 % | HEART RATE: 73 BPM | TEMPERATURE: 98.7 F | SYSTOLIC BLOOD PRESSURE: 123 MMHG | HEIGHT: 67 IN

## 2023-09-12 DIAGNOSIS — M25.50 POLYARTHRALGIA: ICD-10-CM

## 2023-09-12 DIAGNOSIS — Z12.11 COLON CANCER SCREENING: ICD-10-CM

## 2023-09-12 DIAGNOSIS — R73.03 PREDIABETES: ICD-10-CM

## 2023-09-12 DIAGNOSIS — Z12.5 PROSTATE CANCER SCREENING: ICD-10-CM

## 2023-09-12 DIAGNOSIS — E78.2 MIXED HYPERLIPIDEMIA: ICD-10-CM

## 2023-09-12 DIAGNOSIS — Z00.00 MEDICARE ANNUAL WELLNESS VISIT, SUBSEQUENT: Primary | ICD-10-CM

## 2023-09-12 PROBLEM — R20.2 PARESTHESIA: Status: ACTIVE | Noted: 2022-11-14

## 2023-09-12 PROBLEM — G62.9 SENSORY NEUROPATHY: Status: ACTIVE | Noted: 2023-09-12

## 2023-09-12 LAB
ALBUMIN SERPL-MCNC: 4.2 G/DL (ref 3.5–5)
ALBUMIN/GLOB SERPL: 1.4 (ref 1.1–2.2)
ALP SERPL-CCNC: 54 U/L (ref 45–117)
ALT SERPL-CCNC: 27 U/L (ref 12–78)
ANION GAP SERPL CALC-SCNC: 5 MMOL/L (ref 5–15)
AST SERPL-CCNC: 23 U/L (ref 15–37)
BILIRUB SERPL-MCNC: 1.4 MG/DL (ref 0.2–1)
BUN SERPL-MCNC: 17 MG/DL (ref 6–20)
BUN/CREAT SERPL: 13 (ref 12–20)
CALCIUM SERPL-MCNC: 9.4 MG/DL (ref 8.5–10.1)
CHLORIDE SERPL-SCNC: 108 MMOL/L (ref 97–108)
CHOLEST SERPL-MCNC: 136 MG/DL
CO2 SERPL-SCNC: 29 MMOL/L (ref 21–32)
CREAT SERPL-MCNC: 1.33 MG/DL (ref 0.7–1.3)
ERYTHROCYTE [DISTWIDTH] IN BLOOD BY AUTOMATED COUNT: 13.2 % (ref 11.5–14.5)
EST. AVERAGE GLUCOSE BLD GHB EST-MCNC: 105 MG/DL
GLOBULIN SER CALC-MCNC: 3 G/DL (ref 2–4)
GLUCOSE SERPL-MCNC: 92 MG/DL (ref 65–100)
HBA1C MFR BLD: 5.3 % (ref 4–5.6)
HCT VFR BLD AUTO: 45.3 % (ref 36.6–50.3)
HDLC SERPL-MCNC: 67 MG/DL
HDLC SERPL: 2 (ref 0–5)
HGB BLD-MCNC: 14.7 G/DL (ref 12.1–17)
LDLC SERPL CALC-MCNC: 49.6 MG/DL (ref 0–100)
MCH RBC QN AUTO: 30.8 PG (ref 26–34)
MCHC RBC AUTO-ENTMCNC: 32.5 G/DL (ref 30–36.5)
MCV RBC AUTO: 95 FL (ref 80–99)
NRBC # BLD: 0 K/UL (ref 0–0.01)
NRBC BLD-RTO: 0 PER 100 WBC
PLATELET # BLD AUTO: 190 K/UL (ref 150–400)
PMV BLD AUTO: 10.5 FL (ref 8.9–12.9)
POTASSIUM SERPL-SCNC: 4.7 MMOL/L (ref 3.5–5.1)
PROT SERPL-MCNC: 7.2 G/DL (ref 6.4–8.2)
PSA SERPL-MCNC: 1.4 NG/ML (ref 0.01–4)
RBC # BLD AUTO: 4.77 M/UL (ref 4.1–5.7)
SODIUM SERPL-SCNC: 142 MMOL/L (ref 136–145)
TRIGL SERPL-MCNC: 97 MG/DL
VLDLC SERPL CALC-MCNC: 19.4 MG/DL
WBC # BLD AUTO: 6.7 K/UL (ref 4.1–11.1)

## 2023-09-12 PROCEDURE — 99213 OFFICE O/P EST LOW 20 MIN: CPT | Performed by: STUDENT IN AN ORGANIZED HEALTH CARE EDUCATION/TRAINING PROGRAM

## 2023-09-12 PROCEDURE — G8417 CALC BMI ABV UP PARAM F/U: HCPCS | Performed by: STUDENT IN AN ORGANIZED HEALTH CARE EDUCATION/TRAINING PROGRAM

## 2023-09-12 PROCEDURE — 1036F TOBACCO NON-USER: CPT | Performed by: STUDENT IN AN ORGANIZED HEALTH CARE EDUCATION/TRAINING PROGRAM

## 2023-09-12 PROCEDURE — G8427 DOCREV CUR MEDS BY ELIG CLIN: HCPCS | Performed by: STUDENT IN AN ORGANIZED HEALTH CARE EDUCATION/TRAINING PROGRAM

## 2023-09-12 PROCEDURE — 1123F ACP DISCUSS/DSCN MKR DOCD: CPT | Performed by: STUDENT IN AN ORGANIZED HEALTH CARE EDUCATION/TRAINING PROGRAM

## 2023-09-12 PROCEDURE — G0439 PPPS, SUBSEQ VISIT: HCPCS | Performed by: STUDENT IN AN ORGANIZED HEALTH CARE EDUCATION/TRAINING PROGRAM

## 2023-09-12 PROCEDURE — 3017F COLORECTAL CA SCREEN DOC REV: CPT | Performed by: STUDENT IN AN ORGANIZED HEALTH CARE EDUCATION/TRAINING PROGRAM

## 2023-09-12 RX ORDER — DULOXETIN HYDROCHLORIDE 30 MG/1
CAPSULE, DELAYED RELEASE ORAL
Qty: 53 CAPSULE | Refills: 0 | Status: SHIPPED | OUTPATIENT
Start: 2023-09-12 | End: 2023-10-12

## 2023-09-12 SDOH — ECONOMIC STABILITY: FOOD INSECURITY: WITHIN THE PAST 12 MONTHS, THE FOOD YOU BOUGHT JUST DIDN'T LAST AND YOU DIDN'T HAVE MONEY TO GET MORE.: NEVER TRUE

## 2023-09-12 SDOH — ECONOMIC STABILITY: FOOD INSECURITY: WITHIN THE PAST 12 MONTHS, YOU WORRIED THAT YOUR FOOD WOULD RUN OUT BEFORE YOU GOT MONEY TO BUY MORE.: SOMETIMES TRUE

## 2023-09-12 SDOH — ECONOMIC STABILITY: HOUSING INSECURITY
IN THE LAST 12 MONTHS, WAS THERE A TIME WHEN YOU DID NOT HAVE A STEADY PLACE TO SLEEP OR SLEPT IN A SHELTER (INCLUDING NOW)?: NO

## 2023-09-12 SDOH — ECONOMIC STABILITY: INCOME INSECURITY: HOW HARD IS IT FOR YOU TO PAY FOR THE VERY BASICS LIKE FOOD, HOUSING, MEDICAL CARE, AND HEATING?: SOMEWHAT HARD

## 2023-09-12 ASSESSMENT — PATIENT HEALTH QUESTIONNAIRE - PHQ9
2. FEELING DOWN, DEPRESSED OR HOPELESS: 0
1. LITTLE INTEREST OR PLEASURE IN DOING THINGS: 0
SUM OF ALL RESPONSES TO PHQ QUESTIONS 1-9: 0
SUM OF ALL RESPONSES TO PHQ9 QUESTIONS 1 & 2: 0
SUM OF ALL RESPONSES TO PHQ QUESTIONS 1-9: 0

## 2023-09-12 ASSESSMENT — LIFESTYLE VARIABLES
HOW MANY STANDARD DRINKS CONTAINING ALCOHOL DO YOU HAVE ON A TYPICAL DAY: 1 OR 2
HOW OFTEN DO YOU HAVE A DRINK CONTAINING ALCOHOL: MONTHLY OR LESS

## 2023-09-12 NOTE — ASSESSMENT & PLAN NOTE
Cont topical diclofenac for hands and knees. He finds this very helpful. He asked about oral NSAIDS but I discourage chronic oral NSAID use due to side effect profile and he agrees  He would like to try duloxetine for chronic pain - low back - as well as . Start with 30mg x1 week then increase to 60mg daily. He will send me a message or call at the 1 month valerie if he is doing OK and wants to continue. He did not want to set a follow-up appointment for this.

## 2023-09-12 NOTE — ASSESSMENT & PLAN NOTE
Update A1c to assess control. He has gained a little weight in the interim - advised to stop snacking.

## 2023-09-12 NOTE — PATIENT INSTRUCTIONS
Please obtain the following vaccines from your local pharmacy. Please ask your pharmacy to fax our office a copy of the vaccination record. Our fax number is [phone].   - Flu vaccine  - COVID booster   - double check that you received Prevnar 20 vaccine for pneumonia      Preventing Falls: Care Instructions    Talk to your doctor about the medicines you take. Ask if any of them increase the risk of falls and whether they can be changed or stopped. Try to exercise regularly. It can help improve your strength and balance. This can help lower your risk of falling. Practice fall safety and prevention. Wear low-heeled shoes that fit well and give your feet good support. Talk to your doctor if you have foot problems that make this hard. Carry a cellphone or wear a medical alert device that you can use to call for help. Use stepladders instead of chairs to reach high objects. Don't climb if you're at risk for falls. Ask for help, if needed. Wear the correct eyeglasses, if you need them. Make your home safer. Remove rugs, cords, clutter, and furniture from walkways. Keep your house well lit. Use night-lights in hallways and bathrooms. Install and use sturdy handrails on stairways. Wear nonskid footwear, even inside. Don't walk barefoot or in socks without shoes. Be safe outside. Use handrails, curb cuts, and ramps whenever possible. Keep your hands free by using a shoulder bag or backpack. Try to walk in well-lit areas. Watch out for uneven ground, changes in pavement, and debris. Be careful in the winter. Walk on the grass or gravel when sidewalks are slippery. Use de-icer on steps and walkways. Add non-slip devices to shoes. Put grab bars and nonskid mats in your shower or tub and near the toilet. Try to use a shower chair or bath bench when bathing.    Get into a tub or shower by putting in your weaker leg first. Get out with your strong side first. Have a phone or medical alert

## 2023-09-12 NOTE — PROGRESS NOTES
Heart sounds: No murmur heard. Pulmonary:      Effort: Pulmonary effort is normal.   Abdominal:      General: Bowel sounds are normal.      Palpations: Abdomen is soft. Musculoskeletal:      Right lower leg: No edema. Left lower leg: No edema. Neurological:      General: No focal deficit present. Mental Status: He is alert. Gait: Gait normal.   Psychiatric:         Mood and Affect: Mood normal.         Behavior: Behavior normal.            Allergies   Allergen Reactions    Latex Itching     Prior to Visit Medications    Medication Sig Taking? Authorizing Provider   DULoxetine (CYMBALTA) 30 MG extended release capsule Take 1 capsule by mouth daily for 7 days, THEN 2 capsules daily for 23 days.  Yes Agnieszka Stevens MD   atorvastatin (LIPITOR) 20 MG tablet Take by mouth daily Yes Ar Automatic Reconciliation   clotrimazole-betamethasone (LOTRISONE) 1-0.05 % cream Apply topically 2 times daily Yes Ar Automatic Reconciliation   diclofenac sodium (VOLTAREN) 1 % GEL Apply topically 4 times daily as needed Yes Ar Automatic Reconciliation       CareTeam (Including outside providers/suppliers regularly involved in providing care):   Patient Care Team:  Agnieszka Stevens MD as PCP - Kar Velarde MD as PCP - [facility]     Reviewed and updated this visit:  Tobacco  Allergies  Meds  Problems  Med Hx  Surg Hx  Soc Hx  Fam Hx

## 2023-09-13 ENCOUNTER — CLINICAL DOCUMENTATION (OUTPATIENT)
Dept: SPIRITUAL SERVICES | Age: 68
End: 2023-09-13

## 2023-09-13 DIAGNOSIS — R17 ELEVATED BILIRUBIN: Primary | ICD-10-CM

## 2023-09-13 NOTE — RESULT ENCOUNTER NOTE
Will send [facility]   - slightly Cr elevation - not too far off from baseline but does move his GFR <60. Encouraged hydration. Avoid NSAIDS  - t bili 1.4 - new.  Will ask him to repeat lab in 1 mo to trend and will work up if continues to be abnormal

## 2023-09-13 NOTE — PROGRESS NOTES
Advance Care Planning   Ambulatory ACP Specialist Patient Outreach    Date:  9/13/2023    ACP Specialist:  Katheleen Snellen    Outreach call to patient in follow-up to ACP Specialist referral from:Marie Hinojosa MD    [x] PCP  [] Provider   [] Ambulatory Care Management [] Other     For:                  [x] Advance Directive Assistance              [] Complete Portable DNR order              [] Complete POST/POLST/MOST              [] Code Status Discussion             [] Discuss Goals of Care             [] Early ACP Decision-Making              [] Other (Specify)    Date Referral Received:9/12/23    Next Step:   [x] ACP scheduled conversation  [] Outreach again in one week               [] Email / Mail [address]  [] Email / Mail Advance Directive   [] Closing referral.  Routing closure to referring provider/staff and to ACP Specialist . [] Closure letter mailed to patient with invitation to contact ACP Specialist if / when ready. [] Other (Specify here):         [x] At this time, Healthcare Decision Maker Is: Advance Care Planning   Healthcare Decision Maker:    Primary Decision Maker: Bry James - [phone]    Secondary Decision Maker: Joel Catalan - [phone]    Secondary Decision Maker: Bernabe Torres Jr.    Secondary Decision Maker: Too Waller      [] Primary agent named in scanned advance directive. [x] Legal Next of Kin. [] Unable to determine legal decision maker at this time. Outreaches:         [x] 1st -  Date:  9/13/23               Intervention:  [x] Spoke with Patient   [] Left Voice mail [] Email / Mail    [] Agency Spotter  [] Other [phone]) : Outcomes: Outreach phone call to the patient. Spoke with patient requesting to send ACP information to his e-mail address for review with his family prior to the next follow up call on next week. Will attempt to follow up in one week.            [] 2nd -  Date:

## 2023-09-20 ENCOUNTER — CLINICAL DOCUMENTATION (OUTPATIENT)
Dept: SPIRITUAL SERVICES | Age: 68
End: 2023-09-20

## 2023-09-26 DIAGNOSIS — R17 ELEVATED BILIRUBIN: ICD-10-CM

## 2023-09-27 ENCOUNTER — CLINICAL DOCUMENTATION (OUTPATIENT)
Dept: SPIRITUAL SERVICES | Age: 68
End: 2023-09-27

## 2023-09-27 LAB
ALBUMIN SERPL-MCNC: 3.9 G/DL (ref 3.5–5)
ALBUMIN/GLOB SERPL: 1.3 (ref 1.1–2.2)
ALP SERPL-CCNC: 56 U/L (ref 45–117)
ALT SERPL-CCNC: 21 U/L (ref 12–78)
AST SERPL-CCNC: 11 U/L (ref 15–37)
BILIRUB DIRECT SERPL-MCNC: 0.2 MG/DL (ref 0–0.2)
BILIRUB SERPL-MCNC: 0.8 MG/DL (ref 0.2–1)
GLOBULIN SER CALC-MCNC: 3 G/DL (ref 2–4)
PROT SERPL-MCNC: 6.9 G/DL (ref 6.4–8.2)

## 2023-09-27 NOTE — RESULT ENCOUNTER NOTE
Bilirubin normalized. [No Acute Distress] : no acute distress [Well Nourished] : well nourished [Well Developed] : well developed [Normal Sclera/Conjunctiva] : normal sclera/conjunctiva [PERRL] : pupils equal round and reactive to light [EOMI] : extraocular movements intact [Normal Outer Ear/Nose] : the outer ears and nose were normal in appearance [Normal Oropharynx] : the oropharynx was normal [No Respiratory Distress] : no respiratory distress  [No Accessory Muscle Use] : no accessory muscle use [Clear to Auscultation] : lungs were clear to auscultation bilaterally [Normal Rate] : normal rate  [Regular Rhythm] : with a regular rhythm [Normal S1, S2] : normal S1 and S2 [No Murmur] : no murmur heard [No Edema] : there was no peripheral edema [No Extremity Clubbing/Cyanosis] : no extremity clubbing/cyanosis [Soft] : abdomen soft [Non Tender] : non-tender [Non-distended] : non-distended [No Masses] : no abdominal mass palpated [No HSM] : no HSM [No Joint Swelling] : no joint swelling [Grossly Normal Strength/Tone] : grossly normal strength/tone [No Rash] : no rash [Coordination Grossly Intact] : coordination grossly intact [No Focal Deficits] : no focal deficits [Normal Gait] : normal gait [Normal Affect] : the affect was normal [Normal Insight/Judgement] : insight and judgment were intact [Alert and Oriented x3] : oriented to person, place, and time [Normal Mood] : the mood was normal [de-identified] : Fatigue

## 2023-10-09 ENCOUNTER — PATIENT MESSAGE (OUTPATIENT)
Age: 68
End: 2023-10-09

## 2023-10-09 DIAGNOSIS — M25.50 POLYARTHRALGIA: ICD-10-CM

## 2023-10-11 RX ORDER — DULOXETIN HYDROCHLORIDE 30 MG/1
30 CAPSULE, DELAYED RELEASE ORAL DAILY
Qty: 14 CAPSULE | Refills: 0 | Status: SHIPPED | OUTPATIENT
Start: 2023-10-11 | End: 2023-10-25

## 2023-10-12 DIAGNOSIS — M25.50 POLYARTHRALGIA: ICD-10-CM

## 2023-10-12 RX ORDER — DULOXETIN HYDROCHLORIDE 30 MG/1
CAPSULE, DELAYED RELEASE ORAL
Qty: 53 CAPSULE | OUTPATIENT
Start: 2023-10-12

## 2023-10-20 RX ORDER — ATORVASTATIN CALCIUM 20 MG/1
20 TABLET, FILM COATED ORAL DAILY
Qty: 90 TABLET | Refills: 3 | Status: SHIPPED | OUTPATIENT
Start: 2023-10-20

## 2024-01-04 ENCOUNTER — APPOINTMENT (OUTPATIENT)
Facility: HOSPITAL | Age: 69
End: 2024-01-04
Payer: MEDICARE

## 2024-01-04 ENCOUNTER — HOSPITAL ENCOUNTER (EMERGENCY)
Facility: HOSPITAL | Age: 69
Discharge: HOME | End: 2024-01-04
Attending: EMERGENCY MEDICINE
Payer: MEDICARE

## 2024-01-04 VITALS
HEART RATE: 77 BPM | OXYGEN SATURATION: 97 % | SYSTOLIC BLOOD PRESSURE: 122 MMHG | BODY MASS INDEX: 34.06 KG/M2 | TEMPERATURE: 98.4 F | RESPIRATION RATE: 17 BRPM | HEIGHT: 67 IN | DIASTOLIC BLOOD PRESSURE: 74 MMHG | WEIGHT: 217 LBS

## 2024-01-04 DIAGNOSIS — R31.29 MICROSCOPIC HEMATURIA: ICD-10-CM

## 2024-01-04 DIAGNOSIS — R30.0 DYSURIA: Primary | ICD-10-CM

## 2024-01-04 DIAGNOSIS — N30.01 ACUTE CYSTITIS WITH HEMATURIA: ICD-10-CM

## 2024-01-04 LAB
ALBUMIN SERPL-MCNC: 3.3 G/DL (ref 3.5–5)
ALBUMIN/GLOB SERPL: 0.8 (ref 1.1–2.2)
ALP SERPL-CCNC: 55 U/L (ref 45–117)
ALT SERPL-CCNC: 22 U/L (ref 12–78)
ANION GAP SERPL CALC-SCNC: 8 MMOL/L (ref 5–15)
APPEARANCE UR: CLEAR
AST SERPL W P-5'-P-CCNC: 33 U/L (ref 15–37)
BACTERIA URNS QL MICRO: ABNORMAL /HPF
BASOPHILS # BLD: 0 K/UL (ref 0–0.1)
BASOPHILS NFR BLD: 0 % (ref 0–1)
BILIRUB SERPL-MCNC: 0.7 MG/DL (ref 0.2–1)
BILIRUB UR QL: NEGATIVE
BUN SERPL-MCNC: 16 MG/DL (ref 6–20)
BUN/CREAT SERPL: 14 (ref 12–20)
CA-I BLD-MCNC: 9.2 MG/DL (ref 8.5–10.1)
CHLORIDE SERPL-SCNC: 101 MMOL/L (ref 97–108)
CO2 SERPL-SCNC: 29 MMOL/L (ref 21–32)
COLOR UR: YELLOW
CREAT SERPL-MCNC: 1.15 MG/DL (ref 0.7–1.3)
DIFFERENTIAL METHOD BLD: ABNORMAL
EOSINOPHIL # BLD: 0 K/UL (ref 0–0.4)
EOSINOPHIL NFR BLD: 0 % (ref 0–7)
EPITH CASTS URNS QL MICRO: ABNORMAL /LPF
ERYTHROCYTE [DISTWIDTH] IN BLOOD BY AUTOMATED COUNT: 12.5 % (ref 11.5–14.5)
GLOBULIN SER CALC-MCNC: 4.3 G/DL (ref 2–4)
GLUCOSE SERPL-MCNC: 106 MG/DL (ref 65–100)
GLUCOSE UR STRIP.AUTO-MCNC: NEGATIVE MG/DL
HCT VFR BLD AUTO: 44.1 % (ref 36.6–50.3)
HGB BLD-MCNC: 15.1 G/DL (ref 12.1–17)
HGB UR QL STRIP: ABNORMAL
IMM GRANULOCYTES # BLD AUTO: 0 K/UL (ref 0–0.04)
IMM GRANULOCYTES NFR BLD AUTO: 1 % (ref 0–0.5)
KETONES UR QL STRIP.AUTO: 15 MG/DL
LEUKOCYTE ESTERASE UR QL STRIP.AUTO: ABNORMAL
LYMPHOCYTES # BLD: 0.8 K/UL (ref 0.8–3.5)
LYMPHOCYTES NFR BLD: 12 % (ref 12–49)
MCH RBC QN AUTO: 30.8 PG (ref 26–34)
MCHC RBC AUTO-ENTMCNC: 34.2 G/DL (ref 30–36.5)
MCV RBC AUTO: 89.8 FL (ref 80–99)
MONOCYTES # BLD: 0.6 K/UL (ref 0–1)
MONOCYTES NFR BLD: 9 % (ref 5–13)
NEUTS SEG # BLD: 5.5 K/UL (ref 1.8–8)
NEUTS SEG NFR BLD: 78 % (ref 32–75)
NITRITE UR QL STRIP.AUTO: NEGATIVE
PH UR STRIP: 5 (ref 5–8)
PLATELET # BLD AUTO: 175 K/UL (ref 150–400)
PMV BLD AUTO: 9.5 FL (ref 8.9–12.9)
POTASSIUM SERPL-SCNC: 3.7 MMOL/L (ref 3.5–5.1)
PROT SERPL-MCNC: 7.6 G/DL (ref 6.4–8.2)
PROT UR STRIP-MCNC: ABNORMAL MG/DL
RBC # BLD AUTO: 4.91 M/UL (ref 4.1–5.7)
RBC #/AREA URNS HPF: ABNORMAL /HPF (ref 0–5)
SODIUM SERPL-SCNC: 138 MMOL/L (ref 136–145)
SP GR UR REFRACTOMETRY: 1.02 (ref 1–1.03)
URINE CULTURE IF INDICATED: ABNORMAL
UROBILINOGEN UR QL STRIP.AUTO: 0.1 EU/DL (ref 0.2–1)
WBC # BLD AUTO: 7 K/UL (ref 4.1–11.1)
WBC URNS QL MICRO: ABNORMAL /HPF (ref 0–4)

## 2024-01-04 PROCEDURE — 99284 EMERGENCY DEPT VISIT MOD MDM: CPT

## 2024-01-04 PROCEDURE — 74176 CT ABD & PELVIS W/O CONTRAST: CPT

## 2024-01-04 PROCEDURE — 87086 URINE CULTURE/COLONY COUNT: CPT

## 2024-01-04 PROCEDURE — 2580000003 HC RX 258: Performed by: EMERGENCY MEDICINE

## 2024-01-04 PROCEDURE — 85025 COMPLETE CBC W/AUTO DIFF WBC: CPT

## 2024-01-04 PROCEDURE — 80053 COMPREHEN METABOLIC PANEL: CPT

## 2024-01-04 PROCEDURE — 81001 URINALYSIS AUTO W/SCOPE: CPT

## 2024-01-04 RX ORDER — CEPHALEXIN 500 MG/1
500 CAPSULE ORAL 2 TIMES DAILY
Qty: 14 CAPSULE | Refills: 0 | Status: SHIPPED | OUTPATIENT
Start: 2024-01-04 | End: 2024-01-11

## 2024-01-04 RX ORDER — 0.9 % SODIUM CHLORIDE 0.9 %
1000 INTRAVENOUS SOLUTION INTRAVENOUS ONCE
Status: COMPLETED | OUTPATIENT
Start: 2024-01-04 | End: 2024-01-04

## 2024-01-04 RX ADMIN — SODIUM CHLORIDE 1000 ML: 9 INJECTION, SOLUTION INTRAVENOUS at 09:23

## 2024-01-04 ASSESSMENT — PAIN SCALES - GENERAL: PAINLEVEL_OUTOF10: 2

## 2024-01-04 ASSESSMENT — LIFESTYLE VARIABLES
HOW OFTEN DO YOU HAVE A DRINK CONTAINING ALCOHOL: MONTHLY OR LESS
HOW MANY STANDARD DRINKS CONTAINING ALCOHOL DO YOU HAVE ON A TYPICAL DAY: 1 OR 2

## 2024-01-04 ASSESSMENT — PAIN - FUNCTIONAL ASSESSMENT: PAIN_FUNCTIONAL_ASSESSMENT: 0-10

## 2024-01-04 NOTE — ED PROVIDER NOTES
worsen      DISCHARGE MEDICATIONS:  Discharge Medication List as of 1/4/2024 11:37 AM        START taking these medications    Details   cephALEXin (KEFLEX) 500 MG capsule Take 1 capsule by mouth 2 times daily for 7 days, Disp-14 capsule, R-0Normal             Attestations:    Lloyd Romo DO    Please note that this dictation was completed with [employer], the computer voice recognition software.  Quite often unanticipated grammatical, syntax, homophones, and other interpretive errors are inadvertently transcribed by the computer software.  Please disregard these errors.  Please excuse any errors that have escaped final proofreading.  Thank you.         Lloyd Romo DO  01/04/24 [id number]

## 2024-01-04 NOTE — DISCHARGE INSTRUCTIONS
Follow-up with the urologist for the discussion of testicular pain and blood in your urine.  Additional testing might need to be done based on this.    If you develop worsening symptoms, high fevers, chest pain, shortness breath, passing out, confusion, severe pain or any worsening, please return.         Thank you!  Thank you for allowing me to care for you in the emergency department. It is my goal to provide you with excellent care. If you have not received excellent quality care, please ask to speak to the nurse manager. Please fill out the survey that will come to you by mail or email since we listen to your feedback!     Below you will find a list of your tests from today's visit.  Should you have any questions, please do not hesitate to call the emergency department.    Labs  Recent Results (from the past 12 hour(s))   CBC with Auto Differential    Collection Time: 01/04/24  9:15 AM   Result Value Ref Range    WBC 7.0 4.1 - 11.1 K/uL    RBC 4.91 4.10 - 5.70 M/uL    Hemoglobin 15.1 12.1 - 17.0 g/dL    Hematocrit 44.1 36.6 - 50.3 %    MCV 89.8 80.0 - 99.0 FL    MCH 30.8 26.0 - 34.0 PG    MCHC 34.2 30.0 - 36.5 g/dL    RDW 12.5 11.5 - 14.5 %    Platelets 175 150 - 400 K/uL    MPV 9.5 8.9 - 12.9 FL    Neutrophils % 78 (H) 32 - 75 %    Lymphocytes % 12 12 - 49 %    Monocytes % 9 5 - 13 %    Eosinophils % 0 0 - 7 %    Basophils % 0 0 - 1 %    Immature Granulocytes 1 (H) 0.0 - 0.5 %    Neutrophils Absolute 5.5 1.8 - 8.0 K/UL    Lymphocytes Absolute 0.8 0.8 - 3.5 K/UL    Monocytes Absolute 0.6 0.0 - 1.0 K/UL    Eosinophils Absolute 0.0 0.0 - 0.4 K/UL    Basophils Absolute 0.0 0.0 - 0.1 K/UL    Absolute Immature Granulocyte 0.0 0.00 - 0.04 K/UL    Differential Type AUTOMATED     CMP    Collection Time: 01/04/24  9:15 AM   Result Value Ref Range    Sodium 138 136 - 145 mmol/L    Potassium 3.7 3.5 - 5.1 mmol/L    Chloride 101 97 - 108 mmol/L    CO2 29 21 - 32 mmol/L    Anion Gap 8 5 - 15 mmol/L    Glucose 106 (H) 65 -

## 2024-01-04 NOTE — ED TRIAGE NOTES
Pt reports bloody, frequent urination and urinary pain that started on Saturday.  Pt reports it got better, but got worse again last night.  Pt also reporting that he was running a fever last night as well.

## 2024-01-05 LAB
BACTERIA SPEC CULT: NORMAL
COLONY COUNT, CNT: NORMAL
COLONY COUNT, CNT: NORMAL
Lab: NORMAL

## 2024-10-14 RX ORDER — ATORVASTATIN CALCIUM 20 MG/1
20 TABLET, FILM COATED ORAL DAILY
Qty: 90 TABLET | Refills: 0 | Status: SHIPPED | OUTPATIENT
Start: 2024-10-14

## 2024-10-14 NOTE — TELEPHONE ENCOUNTER
Rx sent to pharmacy as previously filled and verified by Verbal Order Read Back with provider.    NV 10/22/2024

## 2024-10-21 SDOH — ECONOMIC STABILITY: FOOD INSECURITY: WITHIN THE PAST 12 MONTHS, YOU WORRIED THAT YOUR FOOD WOULD RUN OUT BEFORE YOU GOT MONEY TO BUY MORE.: SOMETIMES TRUE

## 2024-10-21 SDOH — HEALTH STABILITY: PHYSICAL HEALTH: ON AVERAGE, HOW MANY DAYS PER WEEK DO YOU ENGAGE IN MODERATE TO STRENUOUS EXERCISE (LIKE A BRISK WALK)?: 3 DAYS

## 2024-10-21 SDOH — ECONOMIC STABILITY: INCOME INSECURITY: HOW HARD IS IT FOR YOU TO PAY FOR THE VERY BASICS LIKE FOOD, HOUSING, MEDICAL CARE, AND HEATING?: SOMEWHAT HARD

## 2024-10-21 SDOH — HEALTH STABILITY: PHYSICAL HEALTH: ON AVERAGE, HOW MANY MINUTES DO YOU ENGAGE IN EXERCISE AT THIS LEVEL?: 60 MIN

## 2024-10-21 SDOH — ECONOMIC STABILITY: FOOD INSECURITY: WITHIN THE PAST 12 MONTHS, THE FOOD YOU BOUGHT JUST DIDN'T LAST AND YOU DIDN'T HAVE MONEY TO GET MORE.: SOMETIMES TRUE

## 2024-10-21 SDOH — ECONOMIC STABILITY: TRANSPORTATION INSECURITY
IN THE PAST 12 MONTHS, HAS LACK OF TRANSPORTATION KEPT YOU FROM MEETINGS, WORK, OR FROM GETTING THINGS NEEDED FOR DAILY LIVING?: NO

## 2024-10-21 ASSESSMENT — LIFESTYLE VARIABLES
HAS A RELATIVE, FRIEND, DOCTOR, OR ANOTHER HEALTH PROFESSIONAL EXPRESSED CONCERN ABOUT YOUR DRINKING OR SUGGESTED YOU CUT DOWN: NO
HOW OFTEN DURING THE LAST YEAR HAVE YOU FAILED TO DO WHAT WAS NORMALLY EXPECTED FROM YOU BECAUSE OF DRINKING: NEVER
HOW OFTEN DO YOU HAVE A DRINK CONTAINING ALCOHOL: MONTHLY OR LESS
HOW OFTEN DURING THE LAST YEAR HAVE YOU FOUND THAT YOU WERE NOT ABLE TO STOP DRINKING ONCE YOU HAD STARTED: NEVER
HOW OFTEN DURING THE LAST YEAR HAVE YOU FAILED TO DO WHAT WAS NORMALLY EXPECTED FROM YOU BECAUSE OF DRINKING: NEVER
HOW OFTEN DURING THE LAST YEAR HAVE YOU HAD A FEELING OF GUILT OR REMORSE AFTER DRINKING: NEVER
HAVE YOU OR SOMEONE ELSE BEEN INJURED AS A RESULT OF YOUR DRINKING: NO
HOW OFTEN DURING THE LAST YEAR HAVE YOU NEEDED AN ALCOHOLIC DRINK FIRST THING IN THE MORNING TO GET YOURSELF GOING AFTER A NIGHT OF HEAVY DRINKING: NEVER
HOW OFTEN DURING THE LAST YEAR HAVE YOU BEEN UNABLE TO REMEMBER WHAT HAPPENED THE NIGHT BEFORE BECAUSE YOU HAD BEEN DRINKING: NEVER
HOW OFTEN DURING THE LAST YEAR HAVE YOU HAD A FEELING OF GUILT OR REMORSE AFTER DRINKING: NEVER
HOW OFTEN DO YOU HAVE A DRINK CONTAINING ALCOHOL: 2
HOW OFTEN DURING THE LAST YEAR HAVE YOU FOUND THAT YOU WERE NOT ABLE TO STOP DRINKING ONCE YOU HAD STARTED: NEVER
HOW MANY STANDARD DRINKS CONTAINING ALCOHOL DO YOU HAVE ON A TYPICAL DAY: 2
HOW OFTEN DO YOU HAVE SIX OR MORE DRINKS ON ONE OCCASION: 2
HOW MANY STANDARD DRINKS CONTAINING ALCOHOL DO YOU HAVE ON A TYPICAL DAY: 3 OR 4
HAVE YOU OR SOMEONE ELSE BEEN INJURED AS A RESULT OF YOUR DRINKING: NO
HAS A RELATIVE, FRIEND, DOCTOR, OR ANOTHER HEALTH PROFESSIONAL EXPRESSED CONCERN ABOUT YOUR DRINKING OR SUGGESTED YOU CUT DOWN: NO
HOW OFTEN DURING THE LAST YEAR HAVE YOU BEEN UNABLE TO REMEMBER WHAT HAPPENED THE NIGHT BEFORE BECAUSE YOU HAD BEEN DRINKING: NEVER
HOW OFTEN DURING THE LAST YEAR HAVE YOU NEEDED AN ALCOHOLIC DRINK FIRST THING IN THE MORNING TO GET YOURSELF GOING AFTER A NIGHT OF HEAVY DRINKING: NEVER

## 2024-10-21 ASSESSMENT — PATIENT HEALTH QUESTIONNAIRE - PHQ9
2. FEELING DOWN, DEPRESSED OR HOPELESS: NOT AT ALL
SUM OF ALL RESPONSES TO PHQ QUESTIONS 1-9: 2
SUM OF ALL RESPONSES TO PHQ QUESTIONS 1-9: 2
1. LITTLE INTEREST OR PLEASURE IN DOING THINGS: MORE THAN HALF THE DAYS
SUM OF ALL RESPONSES TO PHQ QUESTIONS 1-9: 2
SUM OF ALL RESPONSES TO PHQ9 QUESTIONS 1 & 2: 2
SUM OF ALL RESPONSES TO PHQ QUESTIONS 1-9: 2

## 2024-10-22 ENCOUNTER — OFFICE VISIT (OUTPATIENT)
Dept: PRIMARY CARE CLINIC | Facility: CLINIC | Age: 69
End: 2024-10-22

## 2024-10-22 VITALS
WEIGHT: 234 LBS | HEIGHT: 67 IN | HEART RATE: 73 BPM | TEMPERATURE: 97.8 F | RESPIRATION RATE: 16 BRPM | SYSTOLIC BLOOD PRESSURE: 137 MMHG | DIASTOLIC BLOOD PRESSURE: 84 MMHG | BODY MASS INDEX: 36.73 KG/M2 | OXYGEN SATURATION: 99 %

## 2024-10-22 DIAGNOSIS — Z12.5 SPECIAL SCREENING, PROSTATE CANCER: ICD-10-CM

## 2024-10-22 DIAGNOSIS — E78.2 MIXED HYPERLIPIDEMIA: ICD-10-CM

## 2024-10-22 DIAGNOSIS — Z00.00 MEDICARE ANNUAL WELLNESS VISIT, SUBSEQUENT: Primary | ICD-10-CM

## 2024-10-22 DIAGNOSIS — M25.50 POLYARTHRALGIA: ICD-10-CM

## 2024-10-22 DIAGNOSIS — R20.2 PARESTHESIA: ICD-10-CM

## 2024-10-22 DIAGNOSIS — R73.03 PREDIABETES: ICD-10-CM

## 2024-10-22 DIAGNOSIS — L57.0 AK (ACTINIC KERATOSIS): ICD-10-CM

## 2024-10-22 RX ORDER — TRIAMCINOLONE ACETONIDE 40 MG/ML
40 INJECTION, SUSPENSION INTRA-ARTICULAR; INTRAMUSCULAR ONCE
Status: DISCONTINUED | OUTPATIENT
Start: 2024-10-22 | End: 2024-10-22

## 2024-10-22 RX ORDER — TRIAMCINOLONE ACETONIDE 40 MG/ML
40 INJECTION, SUSPENSION INTRA-ARTICULAR; INTRAMUSCULAR ONCE
Status: SHIPPED | OUTPATIENT
Start: 2024-10-22

## 2024-10-22 SDOH — ECONOMIC STABILITY: FOOD INSECURITY: WITHIN THE PAST 12 MONTHS, YOU WORRIED THAT YOUR FOOD WOULD RUN OUT BEFORE YOU GOT MONEY TO BUY MORE.: NEVER TRUE

## 2024-10-22 SDOH — ECONOMIC STABILITY: FOOD INSECURITY: WITHIN THE PAST 12 MONTHS, THE FOOD YOU BOUGHT JUST DIDN'T LAST AND YOU DIDN'T HAVE MONEY TO GET MORE.: NEVER TRUE

## 2024-10-22 SDOH — ECONOMIC STABILITY: INCOME INSECURITY: HOW HARD IS IT FOR YOU TO PAY FOR THE VERY BASICS LIKE FOOD, HOUSING, MEDICAL CARE, AND HEATING?: NOT HARD AT ALL

## 2024-10-22 NOTE — ASSESSMENT & PLAN NOTE
Right knee - minimal joint narrowing. Appears to be bursitis on top of mild to minimal arthritis. Incomplete relief with NSAIDs. Discussed trying oral vs intraarticular injections. Patient agreed to cortisone injection.    Procedure Documentation:  After discussion of the risks and benefits and obtaining verbal consent, the patient elected to proceed with a cortisone injection into right knee It was confirmed that the patient does not have history of prior adverse reactions, active infections, or relevant allergies. There was no effusion, erythema, or warmth, and the skin was clear.    * Patient was identified by name and date of birth   * Agreement on procedure being performed was verified  * Risks and Benefits explained to the patient  * Procedure site verified and marked as necessary  * Patient was positioned for comfort  * Consent was signed and verified    Utilizing sterile technique, the skin was prepped.The sites were injected with a mixture of 40 mg of Kenalog and 1cc 1% lidocaine using a 25 gauge needle. The injection(s) was/were completed without complication, and a sterile bandage was applied. The patient tolerated the injection(s) well.  The patient will call immediately with any signs of infection or allergic reaction.     Time: current in chart  Date of procedure: 10/22/2024  Procedure performed by:  Tae Guillermo MD

## 2024-10-22 NOTE — PROGRESS NOTES
Medicare Annual Wellness Visit    Bernabe Waller is here for Medicare AWV    Assessment & Plan   Medicare annual wellness visit, subsequent  Mixed hyperlipidemia  Assessment & Plan:   Chronic, at goal (stable), continue current treatment plan. Will check labs today.  Orders:  -     Lipid Panel  -     CBC with Auto Differential  -     Comprehensive Metabolic Panel  Prediabetes  Assessment & Plan:   Chronic, at goal (stable), continue current treatment plan. Will recheck A1c  Orders:  -     Hemoglobin A1C  Special screening, prostate cancer  -     PSA Screening  Paresthesia  Assessment & Plan:   Not controlled. Will try amitriptyline.  Orders:  -     amitriptyline (ELAVIL) 25 MG tablet; Take 1 tablet by mouth nightly To help with itching/tingling and sleep, Disp-90 tablet, R-0Normal  Polyarthralgia  Assessment & Plan:   Right knee - minimal joint narrowing. Appears to be bursitis on top of mild to minimal arthritis. Incomplete relief with NSAIDs. Discussed trying oral vs intraarticular injections. Patient agreed to cortisone injection.    Procedure Documentation:  After discussion of the risks and benefits and obtaining verbal consent, the patient elected to proceed with a cortisone injection into right knee It was confirmed that the patient does not have history of prior adverse reactions, active infections, or relevant allergies. There was no effusion, erythema, or warmth, and the skin was clear.    * Patient was identified by name and date of birth   * Agreement on procedure being performed was verified  * Risks and Benefits explained to the patient  * Procedure site verified and marked as necessary  * Patient was positioned for comfort  * Consent was signed and verified    Utilizing sterile technique, the skin was prepped.The sites were injected with a mixture of 40 mg of Kenalog and 1cc 1% lidocaine using a 25 gauge needle. The injection(s) was/were completed without complication, and a sterile bandage was

## 2024-10-22 NOTE — PROGRESS NOTES
\"Have you been to the ER, urgent care clinic since your last visit?  Hospitalized since your last visit?\"    NO    “Have you seen or consulted any other health care providers outside of [facility] since your last visit?”    NO    “Have you had a colorectal cancer screening such as a colonoscopy/FIT/Cologuard?    NO    Date of last Colonoscopy: 8/18/2020  No cologuard on file  No FIT/FOBT on file   No flexible sigmoidoscopy on file

## 2024-10-23 LAB
ALBUMIN SERPL-MCNC: 4.6 G/DL (ref 3.8–4.8)
ALP SERPL-CCNC: 54 IU/L (ref 44–121)
ALT SERPL-CCNC: 17 IU/L (ref 0–44)
AST SERPL-CCNC: 25 IU/L (ref 0–40)
BASOPHILS # BLD AUTO: 0 X10E3/UL (ref 0–0.2)
BASOPHILS NFR BLD AUTO: 0 %
BILIRUB SERPL-MCNC: 1 MG/DL (ref 0–1.2)
BUN SERPL-MCNC: 16 MG/DL (ref 8–27)
BUN/CREAT SERPL: 15 (ref 10–24)
CALCIUM SERPL-MCNC: 9.1 MG/DL (ref 8.6–10.2)
CHLORIDE SERPL-SCNC: 105 MMOL/L (ref 96–106)
CHOLEST SERPL-MCNC: 146 MG/DL (ref 100–199)
CO2 SERPL-SCNC: 22 MMOL/L (ref 20–29)
CREAT SERPL-MCNC: 1.08 MG/DL (ref 0.76–1.27)
EGFRCR SERPLBLD CKD-EPI 2021: 73 ML/MIN/1.73
EOSINOPHIL # BLD AUTO: 0.3 X10E3/UL (ref 0–0.4)
EOSINOPHIL NFR BLD AUTO: 3 %
ERYTHROCYTE [DISTWIDTH] IN BLOOD BY AUTOMATED COUNT: 12.6 % (ref 11.6–15.4)
GLOBULIN SER CALC-MCNC: 2.4 G/DL (ref 1.5–4.5)
GLUCOSE SERPL-MCNC: 98 MG/DL (ref 70–99)
HBA1C MFR BLD: 5.7 % (ref 4.8–5.6)
HCT VFR BLD AUTO: 47.6 % (ref 37.5–51)
HDLC SERPL-MCNC: 54 MG/DL
HGB BLD-MCNC: 15.5 G/DL (ref 13–17.7)
IMM GRANULOCYTES # BLD AUTO: 0 X10E3/UL (ref 0–0.1)
IMM GRANULOCYTES NFR BLD AUTO: 0 %
LDLC SERPL CALC-MCNC: 74 MG/DL (ref 0–99)
LYMPHOCYTES # BLD AUTO: 2.1 X10E3/UL (ref 0.7–3.1)
LYMPHOCYTES NFR BLD AUTO: 21 %
MCH RBC QN AUTO: 30.8 PG (ref 26.6–33)
MCHC RBC AUTO-ENTMCNC: 32.6 G/DL (ref 31.5–35.7)
MCV RBC AUTO: 94 FL (ref 79–97)
MONOCYTES # BLD AUTO: 0.6 X10E3/UL (ref 0.1–0.9)
MONOCYTES NFR BLD AUTO: 6 %
NEUTROPHILS # BLD AUTO: 7 X10E3/UL (ref 1.4–7)
NEUTROPHILS NFR BLD AUTO: 70 %
PLATELET # BLD AUTO: 187 X10E3/UL (ref 150–450)
POTASSIUM SERPL-SCNC: 4.5 MMOL/L (ref 3.5–5.2)
PROT SERPL-MCNC: 7 G/DL (ref 6–8.5)
PSA SERPL-MCNC: 1.4 NG/ML (ref 0–4)
RBC # BLD AUTO: 5.04 X10E6/UL (ref 4.14–5.8)
SODIUM SERPL-SCNC: 140 MMOL/L (ref 134–144)
TRIGL SERPL-MCNC: 98 MG/DL (ref 0–149)
VLDLC SERPL CALC-MCNC: 18 MG/DL (ref 5–40)
WBC # BLD AUTO: 10.2 X10E3/UL (ref 3.4–10.8)

## 2024-12-13 DIAGNOSIS — R20.2 PARESTHESIA: ICD-10-CM

## 2025-01-10 ENCOUNTER — TELEPHONE (OUTPATIENT)
Dept: PRIMARY CARE CLINIC | Facility: CLINIC | Age: 70
End: 2025-01-10

## 2025-01-10 RX ORDER — ATORVASTATIN CALCIUM 20 MG/1
20 TABLET, FILM COATED ORAL DAILY
Qty: 90 TABLET | Refills: 0 | OUTPATIENT
Start: 2025-01-10

## 2025-01-10 RX ORDER — ATORVASTATIN CALCIUM 20 MG/1
20 TABLET, FILM COATED ORAL DAILY
Qty: 90 TABLET | Refills: 3 | Status: SHIPPED | OUTPATIENT
Start: 2025-01-10

## 2025-01-10 NOTE — TELEPHONE ENCOUNTER
Pt ([phone]) is requesting a medication refill for the following medication:    Atorvastatin 20 mg     [facility] to;  [phone] [address] - P [phone] - F [phone]     Please assist with this matter.     VLT - PSR (Float Pool)

## 2025-04-20 SDOH — ECONOMIC STABILITY: INCOME INSECURITY: IN THE LAST 12 MONTHS, WAS THERE A TIME WHEN YOU WERE NOT ABLE TO PAY THE MORTGAGE OR RENT ON TIME?: NO

## 2025-04-20 SDOH — ECONOMIC STABILITY: FOOD INSECURITY: WITHIN THE PAST 12 MONTHS, THE FOOD YOU BOUGHT JUST DIDN'T LAST AND YOU DIDN'T HAVE MONEY TO GET MORE.: SOMETIMES TRUE

## 2025-04-20 SDOH — ECONOMIC STABILITY: FOOD INSECURITY: WITHIN THE PAST 12 MONTHS, YOU WORRIED THAT YOUR FOOD WOULD RUN OUT BEFORE YOU GOT MONEY TO BUY MORE.: SOMETIMES TRUE

## 2025-04-20 SDOH — ECONOMIC STABILITY: TRANSPORTATION INSECURITY
IN THE PAST 12 MONTHS, HAS THE LACK OF TRANSPORTATION KEPT YOU FROM MEDICAL APPOINTMENTS OR FROM GETTING MEDICATIONS?: NO

## 2025-04-22 ENCOUNTER — OFFICE VISIT (OUTPATIENT)
Dept: PRIMARY CARE CLINIC | Facility: CLINIC | Age: 70
End: 2025-04-22
Payer: MEDICARE

## 2025-04-22 VITALS
TEMPERATURE: 97.8 F | HEART RATE: 89 BPM | WEIGHT: 235.2 LBS | DIASTOLIC BLOOD PRESSURE: 85 MMHG | SYSTOLIC BLOOD PRESSURE: 137 MMHG | BODY MASS INDEX: 36.84 KG/M2

## 2025-04-22 DIAGNOSIS — F41.9 ANXIOUS MOOD: ICD-10-CM

## 2025-04-22 DIAGNOSIS — M25.50 POLYARTHRALGIA: ICD-10-CM

## 2025-04-22 DIAGNOSIS — R13.10 DYSPHAGIA, UNSPECIFIED TYPE: Primary | ICD-10-CM

## 2025-04-22 DIAGNOSIS — R20.2 PARESTHESIA: ICD-10-CM

## 2025-04-22 PROCEDURE — 1036F TOBACCO NON-USER: CPT | Performed by: FAMILY MEDICINE

## 2025-04-22 PROCEDURE — 1159F MED LIST DOCD IN RCRD: CPT | Performed by: FAMILY MEDICINE

## 2025-04-22 PROCEDURE — G8427 DOCREV CUR MEDS BY ELIG CLIN: HCPCS | Performed by: FAMILY MEDICINE

## 2025-04-22 PROCEDURE — 1160F RVW MEDS BY RX/DR IN RCRD: CPT | Performed by: FAMILY MEDICINE

## 2025-04-22 PROCEDURE — 3017F COLORECTAL CA SCREEN DOC REV: CPT | Performed by: FAMILY MEDICINE

## 2025-04-22 PROCEDURE — 99214 OFFICE O/P EST MOD 30 MIN: CPT | Performed by: FAMILY MEDICINE

## 2025-04-22 PROCEDURE — 1123F ACP DISCUSS/DSCN MKR DOCD: CPT | Performed by: FAMILY MEDICINE

## 2025-04-22 PROCEDURE — G8417 CALC BMI ABV UP PARAM F/U: HCPCS | Performed by: FAMILY MEDICINE

## 2025-04-22 RX ORDER — DICLOFENAC SODIUM 75 MG/1
75 TABLET, DELAYED RELEASE ORAL 2 TIMES DAILY PRN
Qty: 60 TABLET | Refills: 3 | Status: SHIPPED | OUTPATIENT
Start: 2025-04-22

## 2025-04-22 RX ORDER — DULOXETIN HYDROCHLORIDE 30 MG/1
30 CAPSULE, DELAYED RELEASE ORAL DAILY
Qty: 90 CAPSULE | Refills: 3 | Status: SHIPPED | OUTPATIENT
Start: 2025-04-22

## 2025-04-22 SDOH — ECONOMIC STABILITY: FOOD INSECURITY: WITHIN THE PAST 12 MONTHS, YOU WORRIED THAT YOUR FOOD WOULD RUN OUT BEFORE YOU GOT MONEY TO BUY MORE.: NEVER TRUE

## 2025-04-22 SDOH — ECONOMIC STABILITY: FOOD INSECURITY: WITHIN THE PAST 12 MONTHS, THE FOOD YOU BOUGHT JUST DIDN'T LAST AND YOU DIDN'T HAVE MONEY TO GET MORE.: NEVER TRUE

## 2025-04-22 ASSESSMENT — PATIENT HEALTH QUESTIONNAIRE - PHQ9
SUM OF ALL RESPONSES TO PHQ QUESTIONS 1-9: 0
SUM OF ALL RESPONSES TO PHQ QUESTIONS 1-9: 0
2. FEELING DOWN, DEPRESSED OR HOPELESS: NOT AT ALL
SUM OF ALL RESPONSES TO PHQ QUESTIONS 1-9: 0
SUM OF ALL RESPONSES TO PHQ QUESTIONS 1-9: 0
1. LITTLE INTEREST OR PLEASURE IN DOING THINGS: NOT AT ALL

## 2025-04-22 NOTE — ASSESSMENT & PLAN NOTE
Patient reports a couple episodes over the last few weeks while eating fast, especially breads and meat.  Offered referral to GI, but he reports he will try taking smaller bites and eating slower first.

## 2025-04-22 NOTE — ASSESSMENT & PLAN NOTE
Somewhat controlled with diclofenac gel as needed.  He would like to also have an oral version for more severe episodes.

## 2025-04-22 NOTE — PROGRESS NOTES
Subjective  Chief Complaint   Patient presents with    Follow-up     6 months/ feels like food sticking in chest     HPI:  Bernabe Waller is a 69 y.o. male.    History of Present Illness  The patient presents for evaluation of dysphagia, pruritus, anxiety, and knee pain.    He reports experiencing choking episodes on two recent occasions while eating, with food becoming lodged in his throat. One incident was particularly severe, necessitating the use of towels as he regurgitated the obstructed food. He is uncertain if these episodes are due to rapid eating or specific food types. The sensation is described as sudden and unexpected, often occurring after the first bite of a meal, leaving him unable to breathe until the food is expelled. No previous procedures have been performed for this issue.    Persistent itching, predominantly on the scalp, is reported, which is more pronounced at night and often disrupts sleep. A tingling sensation is also noted. Efforts are made to avoid scratching to prevent the development of sores. The symptoms are not believed to be related to pollen exposure. A previously prescribed medication provided temporary relief, but the symptoms have gradually returned. The severity of the itching is less in the extremities compared to the scalp. A recent prescription refill has been obtained.    Duloxetine medication was discontinued after the supply was exhausted. Family members have observed an increase in anxiety levels since stopping the medication. There is a desire to resume the medication to manage anxiety and improve sleep quality.    An injection for knee pain provided some relief, but the pain recurs during periods of activity. Difficulty sitting still and the need to be active often result in kneeling or bending over, which exacerbates the pain. Ibuprofen is not taken, but a gel is used for pain management, providing relief for approximately 2 to 3 hours. Elevating the feet also

## 2025-04-22 NOTE — ASSESSMENT & PLAN NOTE
Patient reports a bit of increased anxiety.  His wife requested to be restarted on duloxetine as she noted improvement while he was on it.

## 2025-04-22 NOTE — PROGRESS NOTES
\"Have you been to the ER, urgent care clinic since your last visit?  Hospitalized since your last visit?\"    NO    “Have you seen or consulted any other health care providers outside our system since your last visit?”    NO    “Have you had a colorectal cancer screening such as a colonoscopy/FIT/Cologuard?    NO    Date of last Colonoscopy: 8/18/2020  No cologuard on file  No FIT/FOBT on file   No flexible sigmoidoscopy on file

## 2025-06-03 ENCOUNTER — TELEPHONE (OUTPATIENT)
Dept: PRIMARY CARE CLINIC | Facility: CLINIC | Age: 70
End: 2025-06-03

## 2025-06-03 DIAGNOSIS — Z12.11 SCREEN FOR COLON CANCER: Primary | ICD-10-CM

## 2025-06-03 NOTE — TELEPHONE ENCOUNTER
The patient is requesting a gastro referral for a routine colonscopy, and wants to have this done before October if possible.

## 2025-08-25 DIAGNOSIS — M25.50 POLYARTHRALGIA: ICD-10-CM

## 2025-08-25 RX ORDER — DICLOFENAC SODIUM 75 MG/1
75 TABLET, DELAYED RELEASE ORAL 2 TIMES DAILY PRN
Qty: 60 TABLET | Refills: 3 | Status: SHIPPED | OUTPATIENT
Start: 2025-08-25

## (undated) DEVICE — SNARE ENDOSCP M L240CM W27MM SHTH DIA2.4MM CHN 2.8MM OVL

## (undated) DEVICE — TUBING HYDR IRR --

## (undated) DEVICE — TRAP SURG QUAD PARABOLA SLOT DSGN SFTY SCRN TRAPEASE

## (undated) DEVICE — FORCEPS BX L240CM JAW DIA2.8MM L CAP W/ NDL MIC MESH TOOTH